# Patient Record
Sex: MALE | Race: WHITE | Employment: OTHER | URBAN - METROPOLITAN AREA
[De-identification: names, ages, dates, MRNs, and addresses within clinical notes are randomized per-mention and may not be internally consistent; named-entity substitution may affect disease eponyms.]

---

## 2017-01-21 ENCOUNTER — LAB REQUISITION (OUTPATIENT)
Dept: LAB | Facility: HOSPITAL | Age: 82
End: 2017-01-21

## 2017-01-21 DIAGNOSIS — I10 ESSENTIAL (PRIMARY) HYPERTENSION: ICD-10-CM

## 2017-01-21 LAB
ALBUMIN SERPL BCP-MCNC: 2.6 G/DL (ref 3.5–4.8)
ALBUMIN/GLOB SERPL: 0.9 {RATIO} (ref 1–2)
ALP SERPL-CCNC: 47 U/L (ref 32–100)
ALT SERPL-CCNC: 12 U/L (ref 17–63)
ANION GAP SERPL CALC-SCNC: 10 MMOL/L (ref 0–18)
AST SERPL-CCNC: 19 U/L (ref 15–41)
BASOPHILS # BLD: 0 K/UL (ref 0–0.2)
BASOPHILS NFR BLD: 1 %
BILIRUB SERPL-MCNC: 0.4 MG/DL (ref 0.3–1.2)
BUN SERPL-MCNC: 13 MG/DL (ref 8–20)
BUN/CREAT SERPL: 16 (ref 10–20)
CALCIUM SERPL-MCNC: 8.2 MG/DL (ref 8.5–10.5)
CHLORIDE SERPL-SCNC: 104 MMOL/L (ref 95–110)
CO2 SERPL-SCNC: 26 MMOL/L (ref 22–32)
CREAT SERPL-MCNC: 0.81 MG/DL (ref 0.5–1.5)
EOSINOPHIL # BLD: 0 K/UL (ref 0–0.7)
EOSINOPHIL NFR BLD: 0 %
ERYTHROCYTE [DISTWIDTH] IN BLOOD BY AUTOMATED COUNT: 17.2 % (ref 11–15)
GLOBULIN PLAS-MCNC: 3 G/DL (ref 2.5–3.7)
GLUCOSE SERPL-MCNC: 100 MG/DL (ref 70–99)
HCT VFR BLD AUTO: 34.8 % (ref 41–52)
HGB BLD-MCNC: 11.4 G/DL (ref 13.5–17.5)
LYMPHOCYTES # BLD: 1.9 K/UL (ref 1–4)
LYMPHOCYTES NFR BLD: 26 %
MCH RBC QN AUTO: 31.3 PG (ref 27–32)
MCHC RBC AUTO-ENTMCNC: 32.8 G/DL (ref 32–37)
MCV RBC AUTO: 95.2 FL (ref 80–100)
MONOCYTES # BLD: 0.8 K/UL (ref 0–1)
MONOCYTES NFR BLD: 11 %
NEUTROPHILS # BLD AUTO: 4.6 K/UL (ref 1.8–7.7)
NEUTROPHILS NFR BLD: 62 %
OSMOLALITY UR CALC.SUM OF ELEC: 290 MOSM/KG (ref 275–295)
PLATELET # BLD AUTO: 185 K/UL (ref 140–400)
PMV BLD AUTO: 8.1 FL (ref 7.4–10.3)
POTASSIUM SERPL-SCNC: 3.7 MMOL/L (ref 3.3–5.1)
PROT SERPL-MCNC: 5.6 G/DL (ref 5.9–8.4)
RBC # BLD AUTO: 3.66 M/UL (ref 4.5–5.9)
SODIUM SERPL-SCNC: 140 MMOL/L (ref 136–144)
WBC # BLD AUTO: 7.3 K/UL (ref 4–11)

## 2017-01-21 PROCEDURE — 85025 COMPLETE CBC W/AUTO DIFF WBC: CPT | Performed by: INTERNAL MEDICINE

## 2017-01-21 PROCEDURE — 80053 COMPREHEN METABOLIC PANEL: CPT | Performed by: INTERNAL MEDICINE

## 2017-02-03 ENCOUNTER — LAB REQUISITION (OUTPATIENT)
Dept: LAB | Facility: HOSPITAL | Age: 82
End: 2017-02-03

## 2017-02-03 DIAGNOSIS — M62.81 MUSCLE WEAKNESS (GENERALIZED): ICD-10-CM

## 2017-02-03 LAB
BASOPHILS # BLD: 0 K/UL (ref 0–0.2)
BASOPHILS NFR BLD: 0 %
EOSINOPHIL # BLD: 0.1 K/UL (ref 0–0.7)
EOSINOPHIL NFR BLD: 1 %
ERYTHROCYTE [DISTWIDTH] IN BLOOD BY AUTOMATED COUNT: 16.3 % (ref 11–15)
HCT VFR BLD AUTO: 36.5 % (ref 41–52)
HGB BLD-MCNC: 11.9 G/DL (ref 13.5–17.5)
LYMPHOCYTES # BLD: 0.9 K/UL (ref 1–4)
LYMPHOCYTES NFR BLD: 14 %
MCH RBC QN AUTO: 31.1 PG (ref 27–32)
MCHC RBC AUTO-ENTMCNC: 32.7 G/DL (ref 32–37)
MCV RBC AUTO: 95.1 FL (ref 80–100)
MONOCYTES # BLD: 0.4 K/UL (ref 0–1)
MONOCYTES NFR BLD: 6 %
NEUTROPHILS # BLD AUTO: 5.3 K/UL (ref 1.8–7.7)
NEUTROPHILS NFR BLD: 79 %
PLATELET # BLD AUTO: 263 K/UL (ref 140–400)
PMV BLD AUTO: 8 FL (ref 7.4–10.3)
RBC # BLD AUTO: 3.84 M/UL (ref 4.5–5.9)
WBC # BLD AUTO: 6.7 K/UL (ref 4–11)

## 2017-02-03 PROCEDURE — 85025 COMPLETE CBC W/AUTO DIFF WBC: CPT | Performed by: INTERNAL MEDICINE

## 2017-04-04 ENCOUNTER — APPOINTMENT (OUTPATIENT)
Dept: GENERAL RADIOLOGY | Facility: HOSPITAL | Age: 82
End: 2017-04-04
Attending: EMERGENCY MEDICINE
Payer: MEDICARE

## 2017-04-04 ENCOUNTER — HOSPITAL ENCOUNTER (EMERGENCY)
Facility: HOSPITAL | Age: 82
Discharge: HOME | End: 2017-04-04
Attending: EMERGENCY MEDICINE
Payer: MEDICARE

## 2017-04-04 ENCOUNTER — APPOINTMENT (OUTPATIENT)
Dept: CT IMAGING | Facility: HOSPITAL | Age: 82
End: 2017-04-04
Attending: EMERGENCY MEDICINE
Payer: MEDICARE

## 2017-04-04 VITALS
TEMPERATURE: 98 F | HEART RATE: 69 BPM | WEIGHT: 246 LBS | SYSTOLIC BLOOD PRESSURE: 131 MMHG | DIASTOLIC BLOOD PRESSURE: 70 MMHG | HEIGHT: 73 IN | BODY MASS INDEX: 32.6 KG/M2 | OXYGEN SATURATION: 100 % | RESPIRATION RATE: 18 BRPM

## 2017-04-04 DIAGNOSIS — S00.03XA SCALP HEMATOMA, INITIAL ENCOUNTER: ICD-10-CM
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
## 2017-04-04 DIAGNOSIS — S80.00XA CONTUSION OF KNEE, UNSPECIFIED LATERALITY, INITIAL ENCOUNTER: ICD-10-CM

## 2017-04-04 DIAGNOSIS — S09.90XA HEAD INJURY, INITIAL ENCOUNTER: ICD-10-CM

## 2017-04-04 PROCEDURE — 70450 CT HEAD/BRAIN W/O DYE: CPT

## 2017-04-04 PROCEDURE — 73560 X-RAY EXAM OF KNEE 1 OR 2: CPT

## 2017-04-04 PROCEDURE — 99284 EMERGENCY DEPT VISIT MOD MDM: CPT

## 2017-04-04 RX ORDER — ACETAMINOPHEN 325 MG/1
650 TABLET ORAL ONCE
Status: COMPLETED | OUTPATIENT
Start: 2017-04-04 | End: 2017-04-04

## 2017-04-04 NOTE — ED INITIAL ASSESSMENT (HPI)
Patient via medics from [facility] home---patient lost balance and fell out of wheelchair hitting head. No LOC, witnessed fall.     Denies any injury or pain    On coumadin

## 2017-04-04 NOTE — ED NOTES
Patient answering questions appropriately, only c/o left knee pain and bump to left of head. On coumadin, CT ordered. Family member at bedside. No loc, no pain at this time.

## 2017-04-05 NOTE — ED PROVIDER NOTES
Patient Seen in: [facility] Emergency Department    History   Patient presents with:  Fall (musculoskeletal, neurologic)      HPI    The patient presents after falling out of his wheelchair at [address] [place] [address].   Hit his left forehead and lef [facility]    PATIENT [place] PREOPERATIVE ORDER FOR IV ANTIBIOTIC SURGICAL SITE INFECTION PROPHYLAXIS.  4/24/2014    Comment Procedure: CERVICAL EPIDURAL INJECTION;  Surgeon: Jono Valenzuela MD;  Location: [address] [place] [address]    PATIENT DO Triage Vitals   BP 04/04/17 1550 104/61 mmHg   Pulse 04/04/17 1550 85   Resp 04/04/17 1550 20   Temp 04/04/17 1550 98.1 °F (36.7 °C)   Temp src 04/04/17 1550 Oral   SpO2 04/04/17 1550 100 %   O2 Device 04/04/17 1550 None (Room air)       Physical Exam   Co Joshua. Additional verbal instructions were given and discussed with the patient and/or caregiver.         Disposition and Plan     Clinical Impression:  Fall from wheelchair, initial encounter  (primary encounter diagnosis)  Head injury, initial enco

## 2017-07-18 ENCOUNTER — HOSPITAL ENCOUNTER (OUTPATIENT)
Dept: CT IMAGING | Facility: HOSPITAL | Age: 82
Discharge: HOME | End: 2017-07-18
Attending: INTERNAL MEDICINE
Payer: MEDICARE

## 2017-07-18 DIAGNOSIS — R51 HEADACHE(784.0): ICD-10-CM

## 2017-07-18 PROCEDURE — 70486 CT MAXILLOFACIAL W/O DYE: CPT | Performed by: INTERNAL MEDICINE

## 2017-07-18 PROCEDURE — 70450 CT HEAD/BRAIN W/O DYE: CPT | Performed by: INTERNAL MEDICINE

## 2018-06-06 ENCOUNTER — HOSPITAL ENCOUNTER (EMERGENCY)
Facility: HOSPITAL | Age: 83
Discharge: HOME | DRG: 871 | End: 2018-06-07
Attending: EMERGENCY MEDICINE
Payer: MEDICARE

## 2018-06-06 ENCOUNTER — APPOINTMENT (OUTPATIENT)
Dept: GENERAL RADIOLOGY | Facility: HOSPITAL | Age: 83
DRG: 871 | End: 2018-06-06
Attending: EMERGENCY MEDICINE
Payer: MEDICARE

## 2018-06-06 DIAGNOSIS — N30.00 ACUTE CYSTITIS WITHOUT HEMATURIA: Primary | ICD-10-CM

## 2018-06-06 PROCEDURE — 96365 THER/PROPH/DIAG IV INF INIT: CPT

## 2018-06-06 PROCEDURE — 87186 SC STD MICRODIL/AGAR DIL: CPT | Performed by: EMERGENCY MEDICINE

## 2018-06-06 PROCEDURE — 83735 ASSAY OF MAGNESIUM: CPT | Performed by: EMERGENCY MEDICINE

## 2018-06-06 PROCEDURE — 80076 HEPATIC FUNCTION PANEL: CPT | Performed by: EMERGENCY MEDICINE

## 2018-06-06 PROCEDURE — 85730 THROMBOPLASTIN TIME PARTIAL: CPT | Performed by: EMERGENCY MEDICINE

## 2018-06-06 PROCEDURE — 71045 X-RAY EXAM CHEST 1 VIEW: CPT | Performed by: EMERGENCY MEDICINE

## 2018-06-06 PROCEDURE — 85610 PROTHROMBIN TIME: CPT | Performed by: EMERGENCY MEDICINE

## 2018-06-06 PROCEDURE — 87077 CULTURE AEROBIC IDENTIFY: CPT | Performed by: EMERGENCY MEDICINE

## 2018-06-06 PROCEDURE — 96361 HYDRATE IV INFUSION ADD-ON: CPT

## 2018-06-06 PROCEDURE — 87086 URINE CULTURE/COLONY COUNT: CPT | Performed by: EMERGENCY MEDICINE

## 2018-06-06 PROCEDURE — 83605 ASSAY OF LACTIC ACID: CPT | Performed by: EMERGENCY MEDICINE

## 2018-06-06 PROCEDURE — 85025 COMPLETE CBC W/AUTO DIFF WBC: CPT | Performed by: EMERGENCY MEDICINE

## 2018-06-06 PROCEDURE — 99285 EMERGENCY DEPT VISIT HI MDM: CPT

## 2018-06-06 PROCEDURE — 81001 URINALYSIS AUTO W/SCOPE: CPT | Performed by: EMERGENCY MEDICINE

## 2018-06-06 PROCEDURE — 87040 BLOOD CULTURE FOR BACTERIA: CPT | Performed by: EMERGENCY MEDICINE

## 2018-06-06 PROCEDURE — 80048 BASIC METABOLIC PNL TOTAL CA: CPT | Performed by: EMERGENCY MEDICINE

## 2018-06-06 RX ORDER — ACETAMINOPHEN 500 MG
1000 TABLET ORAL ONCE
Status: COMPLETED | OUTPATIENT
Start: 2018-06-06 | End: 2018-06-06

## 2018-06-06 RX ORDER — CEPHALEXIN 500 MG/1
500 CAPSULE ORAL 2 TIMES DAILY
Qty: 14 CAPSULE | Refills: 0 | Status: ON HOLD | OUTPATIENT
Start: 2018-06-06 | End: 2018-06-12

## 2018-06-06 RX ORDER — LIDOCAINE HYDROCHLORIDE 20 MG/ML
10 JELLY TOPICAL ONCE
Status: COMPLETED | OUTPATIENT
Start: 2018-06-06 | End: 2018-06-06

## 2018-06-07 ENCOUNTER — APPOINTMENT (OUTPATIENT)
Dept: CT IMAGING | Facility: HOSPITAL | Age: 83
DRG: 871 | End: 2018-06-07
Attending: EMERGENCY MEDICINE
Payer: MEDICARE

## 2018-06-07 ENCOUNTER — HOSPITAL ENCOUNTER (INPATIENT)
Facility: HOSPITAL | Age: 83
LOS: 5 days | Discharge: NURSING, REHAB OR LONG-TERM CARE | DRG: 871 | End: 2018-06-12
Attending: EMERGENCY MEDICINE | Admitting: HOSPITALIST
Payer: MEDICARE

## 2018-06-07 VITALS
RESPIRATION RATE: 20 BRPM | BODY MASS INDEX: 35.78 KG/M2 | SYSTOLIC BLOOD PRESSURE: 108 MMHG | WEIGHT: 270 LBS | TEMPERATURE: 99 F | HEIGHT: 73 IN | HEART RATE: 69 BPM | OXYGEN SATURATION: 92 % | DIASTOLIC BLOOD PRESSURE: 55 MMHG

## 2018-06-07 DIAGNOSIS — N39.0 URINARY TRACT INFECTION WITHOUT HEMATURIA, SITE UNSPECIFIED: Primary | ICD-10-CM

## 2018-06-07 DIAGNOSIS — R78.81 BACTEREMIA: ICD-10-CM

## 2018-06-07 PROCEDURE — 87040 BLOOD CULTURE FOR BACTERIA: CPT | Performed by: EMERGENCY MEDICINE

## 2018-06-07 PROCEDURE — 80048 BASIC METABOLIC PNL TOTAL CA: CPT | Performed by: EMERGENCY MEDICINE

## 2018-06-07 PROCEDURE — 74176 CT ABD & PELVIS W/O CONTRAST: CPT | Performed by: EMERGENCY MEDICINE

## 2018-06-07 PROCEDURE — 83605 ASSAY OF LACTIC ACID: CPT | Performed by: EMERGENCY MEDICINE

## 2018-06-07 PROCEDURE — 96365 THER/PROPH/DIAG IV INF INIT: CPT

## 2018-06-07 PROCEDURE — 85025 COMPLETE CBC W/AUTO DIFF WBC: CPT | Performed by: EMERGENCY MEDICINE

## 2018-06-07 PROCEDURE — 36415 COLL VENOUS BLD VENIPUNCTURE: CPT

## 2018-06-07 PROCEDURE — 99285 EMERGENCY DEPT VISIT HI MDM: CPT

## 2018-06-07 PROCEDURE — 96366 THER/PROPH/DIAG IV INF ADDON: CPT

## 2018-06-07 RX ORDER — SODIUM CHLORIDE 0.9 % (FLUSH) 0.9 %
3 SYRINGE (ML) INJECTION AS NEEDED
Status: DISCONTINUED | OUTPATIENT
Start: 2018-06-07 | End: 2018-06-12

## 2018-06-07 RX ORDER — METOCLOPRAMIDE HYDROCHLORIDE 5 MG/ML
10 INJECTION INTRAMUSCULAR; INTRAVENOUS EVERY 8 HOURS PRN
Status: DISCONTINUED | OUTPATIENT
Start: 2018-06-07 | End: 2018-06-12

## 2018-06-07 RX ORDER — ONDANSETRON 2 MG/ML
4 INJECTION INTRAMUSCULAR; INTRAVENOUS EVERY 6 HOURS PRN
Status: DISCONTINUED | OUTPATIENT
Start: 2018-06-07 | End: 2018-06-12

## 2018-06-07 RX ORDER — POLYETHYLENE GLYCOL 3350 17 G/17G
17 POWDER, FOR SOLUTION ORAL DAILY PRN
Status: DISCONTINUED | OUTPATIENT
Start: 2018-06-07 | End: 2018-06-12

## 2018-06-07 RX ORDER — ACETAMINOPHEN 325 MG/1
650 TABLET ORAL EVERY 6 HOURS PRN
Status: DISCONTINUED | OUTPATIENT
Start: 2018-06-07 | End: 2018-06-08

## 2018-06-07 RX ORDER — DEXTROSE MONOHYDRATE 25 G/50ML
50 INJECTION, SOLUTION INTRAVENOUS AS NEEDED
Status: DISCONTINUED | OUTPATIENT
Start: 2018-06-07 | End: 2018-06-12

## 2018-06-07 RX ORDER — BISACODYL 10 MG
10 SUPPOSITORY, RECTAL RECTAL
Status: DISCONTINUED | OUTPATIENT
Start: 2018-06-07 | End: 2018-06-12

## 2018-06-07 NOTE — ED PROVIDER NOTES
Patient Seen in: [facility] Emergency Department    History   Patient presents with:  Abnormal Result (metabolic, cardiac)    Stated Complaint: + GRAM NEGATIVE BLOOD CULTURE RETURN    HPI    Patient is an 66-year-old gentleman who is in the ER yes MD;  Location: [address] [place] [address],[address]  4/24/2014: PATIENT DOCUMENTED NOT TO HAVE EXPERIENCED ANY*      Comment: Procedure: CERVICAL EPIDURAL INJECTION;                 Surgeon: Joan Major MD;  Location: [facility]                SURGICAL RL rate, regular rhythm, normal heart sounds and intact distal pulses. Pulmonary/Chest: Effort normal and breath sounds normal. No respiratory distress. Abdominal: Soft. Bowel sounds are normal. Exhibits no distension and no mass. There is no tenderness. 2015  ------------------------------------------------------------      [facility]     Admission disposition: 6/7/2018  8:15 PM       I spoke with the [facility] hospitalist who request I speak with urology and infectious disease.   Infectious disease request that blood unspecified  (primary encounter diagnosis)  Bacteremia    Disposition:  Admit  6/7/2018  8:15 pm    Follow-up:  No follow-up provider specified.   We recommend that you schedule follow up care with a primary care provider within the next three months to obt

## 2018-06-07 NOTE — PROGRESS NOTES
[facility] Pharmacy Note: Antimicrobial Weight Dose Adjustment for: ceftriaxone (ROCEPHIN)    Oriana Vazquez is a 80year old male who has been prescribed ceftriaxone (ROCEPHIN) 1000 mg every 24 hours.   CrCl is estimated creatinine clearance is 46.6 mL/min (bas

## 2018-06-07 NOTE — ED INITIAL ASSESSMENT (HPI)
Care assumed from ems. Per ems, pt has had a fever for a few days and and started vomiting today. Pt denies feelign nauseous and denies pain at this time. Pt denies diarrhea. Pt is aox3-4. Skin color is appropriate.

## 2018-06-07 NOTE — ED NOTES
ermd recommends to leave the iv in  For possible iv antbx at nursing home. rn at [facility] confirmed that this rn can leave the iv in for transport.

## 2018-06-07 NOTE — ED PROVIDER NOTES
Patient Seen in: [facility] Emergency Department    History   Patient presents with:  Fever (infectious)    Stated Complaint: Fever    HPI    25-year-old male presents from the nursing home for complaint of a fever for the past 2 days.   Patient re Nima  3/20/2014: PATIENT DOCUMENTED NOT TO HAVE EXPERIENCED ANY*      Comment: Procedure: CERVICAL EPIDURAL;  Surgeon:                Tomeka Patel MD;  Location: [address] [place] [address],[address]  4/24/2014: PATIENT DOCUMENTED NOT TO HAVE Viviana Irvin Wt 122.5 kg   SpO2 92%   BMI 35.62 kg/m²         Physical Exam   Constitutional: He is oriented to person, place, and time. He appears well-developed and well-nourished. HENT:   Head: Normocephalic and atraumatic.    Mouth/Throat: Oropharynx is clear and Notable for the following:     RBC 3.63 (*)     HGB 10.9 (*)     HCT 32.8 (*)     RDW 16.9 (*)     MPV 7.2 (*)     All other components within normal limits   LACTIC ACID, PLASMA - Normal   MAGNESIUM - Normal   PROTHROMBIN TIME (PT) - Normal   PTT, ACTIVAT that patient can be discharged home. Patient is otherwise without any evidence of severe sepsis or septic shock. Imaging:   Xr Chest Ap Portable  (cpt=71045)    Result Date: 6/6/2018  PROCEDURE: XR CHEST AP PORTABLE (CPT=71010) TIME: 1933 hrs.   Eric Hobson hematuria  (primary encounter diagnosis)    Disposition:  Discharge  6/6/2018 10:51 pm    Follow-up:  Rickey Pettit MD  [address] [facility] [address] [phone]    Schedule an appointment as soon as possible for a visit in 2 days  For fallon

## 2018-06-07 NOTE — ED NOTES
Report given to rn at [facility] of [address] for [facility] = 0030. Family members remain at [facility].

## 2018-06-08 PROCEDURE — 83036 HEMOGLOBIN GLYCOSYLATED A1C: CPT | Performed by: HOSPITALIST

## 2018-06-08 PROCEDURE — 82962 GLUCOSE BLOOD TEST: CPT

## 2018-06-08 PROCEDURE — 85025 COMPLETE CBC W/AUTO DIFF WBC: CPT | Performed by: HOSPITALIST

## 2018-06-08 PROCEDURE — 83735 ASSAY OF MAGNESIUM: CPT | Performed by: HOSPITALIST

## 2018-06-08 PROCEDURE — 94660 CPAP INITIATION&MGMT: CPT

## 2018-06-08 PROCEDURE — 80048 BASIC METABOLIC PNL TOTAL CA: CPT | Performed by: HOSPITALIST

## 2018-06-08 PROCEDURE — 5A09357 ASSISTANCE WITH RESPIRATORY VENTILATION, LESS THAN 24 CONSECUTIVE HOURS, CONTINUOUS POSITIVE AIRWAY PRESSURE: ICD-10-PCS | Performed by: HOSPITALIST

## 2018-06-08 PROCEDURE — A4216 STERILE WATER/SALINE, 10 ML: HCPCS | Performed by: HOSPITALIST

## 2018-06-08 PROCEDURE — 85610 PROTHROMBIN TIME: CPT | Performed by: HOSPITALIST

## 2018-06-08 RX ORDER — FAMOTIDINE 20 MG/1
20 TABLET ORAL DAILY
Status: ON HOLD | COMMUNITY
End: 2018-06-12

## 2018-06-08 RX ORDER — GABAPENTIN 300 MG/1
600 CAPSULE ORAL NIGHTLY
Status: DISCONTINUED | OUTPATIENT
Start: 2018-06-08 | End: 2018-06-12

## 2018-06-08 RX ORDER — ALLOPURINOL 300 MG/1
300 TABLET ORAL DAILY
Status: DISCONTINUED | OUTPATIENT
Start: 2018-06-08 | End: 2018-06-12

## 2018-06-08 RX ORDER — ALFUZOSIN HYDROCHLORIDE 10 MG/1
10 TABLET, EXTENDED RELEASE ORAL
Status: DISCONTINUED | OUTPATIENT
Start: 2018-06-08 | End: 2018-06-12

## 2018-06-08 RX ORDER — FAMOTIDINE 20 MG/1
20 TABLET ORAL DAILY
Status: DISCONTINUED | OUTPATIENT
Start: 2018-06-08 | End: 2018-06-08

## 2018-06-08 RX ORDER — CYCLOBENZAPRINE HCL 10 MG
10 TABLET ORAL NIGHTLY
Status: DISCONTINUED | OUTPATIENT
Start: 2018-06-08 | End: 2018-06-12

## 2018-06-08 RX ORDER — FLUTICASONE PROPIONATE 50 MCG
1 SPRAY, SUSPENSION (ML) NASAL DAILY
Status: DISCONTINUED | OUTPATIENT
Start: 2018-06-08 | End: 2018-06-12

## 2018-06-08 RX ORDER — FINASTERIDE 5 MG/1
5 TABLET, FILM COATED ORAL DAILY
Status: DISCONTINUED | OUTPATIENT
Start: 2018-06-08 | End: 2018-06-12

## 2018-06-08 RX ORDER — DONEPEZIL HYDROCHLORIDE 10 MG/1
10 TABLET, FILM COATED ORAL DAILY
Status: DISCONTINUED | OUTPATIENT
Start: 2018-06-08 | End: 2018-06-12

## 2018-06-08 RX ORDER — PANTOPRAZOLE SODIUM 40 MG/1
40 TABLET, DELAYED RELEASE ORAL
Status: DISCONTINUED | OUTPATIENT
Start: 2018-06-08 | End: 2018-06-12

## 2018-06-08 RX ORDER — ACETAMINOPHEN 10 MG/ML
1000 INJECTION, SOLUTION INTRAVENOUS ONCE
Status: COMPLETED | OUTPATIENT
Start: 2018-06-08 | End: 2018-06-08

## 2018-06-08 RX ORDER — DULOXETIN HYDROCHLORIDE 30 MG/1
60 CAPSULE, DELAYED RELEASE ORAL DAILY
Status: DISCONTINUED | OUTPATIENT
Start: 2018-06-08 | End: 2018-06-12

## 2018-06-08 NOTE — CM/SW NOTE
EDWIN confirmed w/ Jasmin Neighbors from [facility] that pt is from their facility long term. EDWIN asked CIPRIANO/Belinda to send clinical updates to [facility] via [employer]. EDWIN/JAZMINE to remain available for support and/or discharge planning.      Mal Chacon, [address]

## 2018-06-08 NOTE — ED NOTES
Patient to ED with positive blood culture result. Rochephin given as ordered; blood cultures previously drawn. New order for blood cultures received however Rochephin already began infusing. Dr. Dimple Arreola notified, ok to draw new set of blood cultures.

## 2018-06-08 NOTE — CONSULTS
[facility] - [facility]    Report of Consultation    Jayme Norwood Patient Status:  Inpatient    10/8/1935 MRN [id number]   Location [facility] 5SW/SE Attending Branden Garcia MD   Hosp Day # 1 PCP Alla Cardona MD     Date of Admission Nima  3/20/2014: PATIENT DOCUMENTED NOT TO HAVE EXPERIENCED ANY*      Comment: Procedure: CERVICAL EPIDURAL;  Surgeon:                Joan Major MD;  Location: [address] [place], [place] [address]  4/24/2014: PATIENT DOCUMENTED NOT TO HAVE [address] mg, 10 mg, Oral, Daily with breakfast  •  Meropenem (MERREM) 500 mg in sodium chloride 0.9% 100 mL MBP, 500 mg, Intravenous, Q8H  •  Normal Saline Flush 0.9 % injection 3 mL, 3 mL, Intravenous, PRN  •  PEG 3350 (MIRALAX) powder packet 17 g, 17 g, Oral, Kelly Melchor primary contributing factor to UTI  Needs bladder decompression via wallis, pt agreeable  Did require dilation of the meatal stenosis with Liban Platts buren sounds  16fr wallis then placed and secured to gravity drainage, urine cloudy  Pt tolerated well  Otherwise s

## 2018-06-08 NOTE — CONSULTS
[facility] AND CLINICS  [facility] Infectious Disease Consult    Early Danas Patient Status:  Inpatient    10/8/1935 MRN [id number]   Location [facility] 5SW/SE Attending Scot Jeffrey DO   Hosp Day # 1 PCP Yuri Agarwal MD     Reason for Consultation: Surgeon: Samantha He MD;  Location: [address] [place] [address] date: OTHER SURGICAL HISTORY      Comment: bleeding ulcer treated  4/19/16: OTHER SURGICAL HISTORY      Comment: Cysto  Dr. Dorene Hood  3/20/2014: PATIENT DOCUMENTED NOT T % 100 mL MBP/ADD-vantage, 2 g, Intravenous, Q24H  •  dextrose 50 % injection 50 mL, 50 mL, Intravenous, PRN  •  Glucose-Vitamin C (DEX-4) 4-0.006 g chewable tab 4 tablet, 4 tablet, Oral, Q15 Min PRN  •  glucose (DEX4) oral liquid 15 g, 15 g, Oral, Q15 Min 110/69 - - 74 21 97 % - -   06/07/18 1830 132/69 - - 67 12 97 % - -   06/07/18 1800 131/75 99.7 °F (37.6 °C) Oral 66 17 95 % 6' 1\" (1.854 m) 277 lb (125.6 kg)         Physical Exam:   General: alert, cooperative, oriented. No respiratory distress.    Head calculus. There is mild perivesical fat infiltration   consistent with infection and/or inflammation. 3. Large amount of stool throughout the colon consistent with constipation. Fecal ball in the rectum suggests impending fecal impaction.  Correlate clinic Constipation, will get PSA too,   Will check post void residual too,   Tenderness in RUQ still, IV hydration per PCP,     3. Cough on admission: ? Aspiration with emesis, constipation,   No cough at this point,   On CPAP with baseline settings,     4.

## 2018-06-08 NOTE — PLAN OF CARE
Problem: Diabetes/Glucose Control  Goal: Glucose maintained within prescribed range  INTERVENTIONS:  - Monitor Blood Glucose as ordered  - Assess for signs and symptoms of hyperglycemia and hypoglycemia  - Administer ordered medications to maintain glucose of injury  - Provide assistive devices as appropriate  - Consider OT/PT consult to assist with strengthening/mobility  - Encourage toileting schedule   Outcome: Progressing  No falls this shift.  Bed in low locked position, call light in reach, fall precaut

## 2018-06-08 NOTE — H&P
[facility] HOSPITALIST HISTORY AND PHYSICAL     CC: Patient presents with:  Abnormal Result (metabolic, cardiac)       PCP: Norma Lopez MD    History of Present Illness:   Patient is a 80year old male with PMH sig for Parkinsons, IDDM, hypothyroidism, HTN, HL CERVICAL EPIDURAL INJECTION;                 Surgeon: Josué Myers MD;  Location: [address] [place] [address]  No date: OTHER SURGICAL HISTORY      Comment: bleeding ulcer treated  4/19/16: OTHER SURGICAL HISTORY      Comment: Cysto  Dr. Richard Ivan LE edema b/l , DP pulses 2+ b/l  Neuro: sensation intact, no focal deficits  SKIN- no rashes, no lesion  PSYCH- aao to self not location or year       LABS:     Lab Results  Component Value Date   WBC 8.2 06/08/2018   HGB 9.9 06/08/2018   HCT 29.2 06/08/20 well-seen. ADRENALS:   No defined mass or abnormal enlargement. URINARY BLADDER: Partially distended bladder with mild diffuse wall thickening and/or trabeculation. Mild perivesical infiltration. No gross bladder mass/lesion or intravesical calculus.   LI LUNG BASES: Mild posterior pleural thickening versus trace left effusion. COPD with bibasilar atelectasis and or scarring. OTHER: Mitral annular and coronary artery calcifications      CONCLUSION:  1. The study is limited by artifact.  Suggestion of mild ri FINDINGS:  CARDIAC/VASC: Heart size and pulmonary vascularity normal. Atherosclerotic calcification aorta. MEDIAST/HALIMA:   No visible mass or adenopathy. LUNGS/PLEURA: Suboptimal inspiration with vascular crowding. No consolidation or pleural effusion.  BON

## 2018-06-08 NOTE — PHYSICAL THERAPY NOTE
Attempted physical therapy treatment. Patient presented in bed sleeping; CPAP on. Patient agreeable to work with therapy, however, kept falling asleep unable to participate. Will re-attempt once patient is appropriate to participate.  Patient left in care o

## 2018-06-08 NOTE — OCCUPATIONAL THERAPY NOTE
Orders received, chart reviewed. RN aware of intent to treat. Upon arrival, pt supine in bed sleeping w/ CPAP on. Pt aroused to calling of his name and was agreeable to therapy.  However, pt promptly falling asleep while attempt made to obtain occupational

## 2018-06-08 NOTE — PLAN OF CARE
Problem: Diabetes/Glucose Control  Goal: Glucose maintained within prescribed range  INTERVENTIONS:  - Monitor Blood Glucose as ordered  - Assess for signs and symptoms of hyperglycemia and hypoglycemia  - Administer ordered medications to maintain glucose SAFETY ADULT - FALL  Goal: Free from fall injury  INTERVENTIONS:  - Assess pt frequently for physical needs  - Identify cognitive and physical deficits and behaviors that affect risk of falls. - [place] fall precautions as indicated by assessment.   - Ed

## 2018-06-09 PROCEDURE — 82962 GLUCOSE BLOOD TEST: CPT

## 2018-06-09 PROCEDURE — 85610 PROTHROMBIN TIME: CPT | Performed by: HOSPITALIST

## 2018-06-09 PROCEDURE — 97161 PT EVAL LOW COMPLEX 20 MIN: CPT

## 2018-06-09 PROCEDURE — 94660 CPAP INITIATION&MGMT: CPT

## 2018-06-09 PROCEDURE — 80048 BASIC METABOLIC PNL TOTAL CA: CPT | Performed by: HOSPITALIST

## 2018-06-09 PROCEDURE — 97110 THERAPEUTIC EXERCISES: CPT

## 2018-06-09 PROCEDURE — A4216 STERILE WATER/SALINE, 10 ML: HCPCS | Performed by: HOSPITALIST

## 2018-06-09 PROCEDURE — 97167 OT EVAL HIGH COMPLEX 60 MIN: CPT

## 2018-06-09 PROCEDURE — 85025 COMPLETE CBC W/AUTO DIFF WBC: CPT | Performed by: HOSPITALIST

## 2018-06-09 PROCEDURE — 92610 EVALUATE SWALLOWING FUNCTION: CPT

## 2018-06-09 RX ORDER — ENOXAPARIN SODIUM 100 MG/ML
40 INJECTION SUBCUTANEOUS DAILY
Status: DISCONTINUED | OUTPATIENT
Start: 2018-06-09 | End: 2018-06-12

## 2018-06-09 NOTE — PROGRESS NOTES
[facility] AND [facility] Infectious Disease Progress Note    Tonja Koo Patient Status:  Inpatient    10/8/1935 MRN [id number]   Location [facility] 5SW/SE Attending Tyler Robles MD   Hosp Day # 2 PCP Byron Silva MD     Subjective:  Alexandria Tristan Glucose-Vitamin C (DEX-4) 4-0.006 g chewable tab 4 tablet, 4 tablet, Oral, Q15 Min PRN  •  glucose (DEX4) oral liquid 15 g, 15 g, Oral, Q15 Min PRN  •  Insulin Aspart Pen (NOVOLOG) 100 UNIT/ML flexpen 1-5 Units, 1-5 Units, Subcutaneous, TID CC    Physical now and started on IV Meropenem, d # 2,      2.   R Pyelonephritis: with abn UA,   CT scan with R hydro, possible stone R Ureter, Cystitis,   ? Chronic outlet obstruction, ?   Constipation,   Wolf in now, Noted Urology input noted,   Tenderness in RUQ stil

## 2018-06-09 NOTE — PROGRESS NOTES
[facility] Patient Status:  Inpatient    10/8/1935 MRN [id number]   Location [facility] 5SW/SE Attending Messi Gillette MD   Hosp Day # 2 PCP [place] Flores MD     Subjective:   Interval History: has no complaint, much urinary retention, meatal stricture, urosepsis     Discussed with pt  Clinically much improved from yesterday  Cont abx  Cont wallis  Likely outpt future voiding trial once infection fully cleared  Cont bph meds in interim  All questions answered  Teagan Dimas

## 2018-06-09 NOTE — OCCUPATIONAL THERAPY NOTE
OCCUPATIONAL THERAPY EVALUATION - INPATIENT     Room Number: 526/526-A  Evaluation Date: 6/9/2018  Type of Evaluation: Initial  Presenting Problem: UTI    Physician Order: IP Consult to Occupational Therapy  Reason for Therapy: ADL/IADL Dysfunction and D History:  3/20/2014: [facility] SEVILLA & Michelle Jacob NDL/CATH SPI DX/THER EAM      Comment: Procedure: CERVICAL EPIDURAL;  Surgeon:                Yunior Blanca MD;  Location: / [address] [place] [address]  4/24/2014: [facility] NDL/CATH SPI DX/THER Equipment: Hospital bed    Occupation/Status: retired  Hand Dominance: Right  Drives: No  Patient Regularly Uses: Reading glasses    Stairs in Home: 0  Use of Assistive Device(s): bed bound, wheelchair    Prior Level of [place]: dependent, dilan lift (G-Code): CM    FUNCTIONAL TRANSFER ASSESSMENT  Supine to Sit : Dependent assistance  Sit to Stand: Not tested  Toilet Transfer: dependent  Shower Transfer: dependent  Chair Transfer: dependent    Bedroom Mobility: unable    BALANCE ASSESSMENT  Static Sitt

## 2018-06-09 NOTE — SLP NOTE
ADULT SWALLOWING EVALUATION    ASSESSMENT    ASSESSMENT/OVERALL IMPRESSION:  Pt seen sitting upright in bed for BSE. Pt alert, oriented, and cooperative for PO trials.  SLP fed pt trials of solid consistentcy, mechanical soft consistency, puree consistency, Urinary tract infection without hematuria    Bacteremia    Past Medical History  Past Medical History:   Diagnosis Date   • Depression    • Hypothyroidism    • Obesity, unspecified    • Other and unspecified hyperlipidemia    • Unspecified essential hypert without overt signs or symptoms of aspiration with 100 % accuracy over 2 session(s).   In Progress   Goal #2 The patient/family/caregiver will demonstrate understanding and implementation of aspiration precautions and swallow strategies independently over 2

## 2018-06-09 NOTE — PROGRESS NOTES
[facility] Hospitalist Progress Note     PCP: Kj Jain MD    CC: Follow up       Assessment/Plan:     Patient is a 80year old male with PMH sig for Parkinsons, IDDM, hypothyroidism, HTN, HL, GENET a/w abnormal blood culture.  He originally presented to the °C)  Pulse:  [59-75] 75  Resp:  [16-20] 20  BP: (105-144)/(52-74) 105/52    Intake/Output:    Intake/Output Summary (Last 24 hours) at 06/09/18 1753  Last data filed at 06/09/18 1335   Gross per 24 hour   Intake              420 ml   Output             220 --   1.1   --   1.2  1.1       Recent Labs   Lab  06/05/18 1820 06/06/18   1943  06/07/18   1814  06/08/18   0554  06/09/18   0557   NA  135*  137  138  139  139   K  4.4  4.4  4.2  4.1  4.1   CL  104  103  106  107  105   CO2  23  24  24  25  25   BUN significant gastritis. Patient has a history of prior gastritis and gastric ulcer disease. Surgical clip at the duodenum may relate to prior surgery. Correlate clinically.      Dictated by (CST): Franca Medrano MD on 6/07/2018 at 19:47     Approved by (CS

## 2018-06-09 NOTE — PHYSICAL THERAPY NOTE
Chart reviewed, RN provided consent to see pt. Subjective/ History:   Pt greeted, pt reporting he is mostly bed bound at baseline, living at [facility] with assist for all ADLs.  Pt reports he transfers to w/c 1x/week to have dinner with his d verbal cues, agreeable to perform home exercises-staff and daughter to encourage as able. Pt appears to be at his functional baseline- total assist for all ADLs, to d/c from acute PT at this time.  PT recommendation return to SNF at [facility] with 24 hr

## 2018-06-09 NOTE — PLAN OF CARE
Problem: Diabetes/Glucose Control  Goal: Glucose maintained within prescribed range  INTERVENTIONS:  - Monitor Blood Glucose as ordered  - Assess for signs and symptoms of hyperglycemia and hypoglycemia  - Administer ordered medications to maintain glucose assessment.  - Educate pt/family on patient safety including physical limitations  - Instruct pt to call for assistance with activity based on assessment  - Modify environment to reduce risk of injury  - Provide assistive devices as appropriate  - Consider

## 2018-06-09 NOTE — PLAN OF CARE
Problem: Patient/Family Goals  Goal: Patient/Family Long Term Goal  Patient's Long Term Goal: return to [place]    Interventions:  - IV abx  Follow MD orders  Monitor labs  - See additional Care Plan goals for specific interventions    Outcome: Progressi assessment  - Modify environment to reduce risk of injury  - Provide assistive devices as appropriate  - Consider OT/PT consult to assist with strengthening/mobility  - Encourage toileting schedule   Outcome: Progressing  No falls this shift.  Bed in low lo

## 2018-06-10 PROCEDURE — 94660 CPAP INITIATION&MGMT: CPT

## 2018-06-10 PROCEDURE — 80048 BASIC METABOLIC PNL TOTAL CA: CPT | Performed by: HOSPITALIST

## 2018-06-10 PROCEDURE — A4216 STERILE WATER/SALINE, 10 ML: HCPCS

## 2018-06-10 PROCEDURE — 85025 COMPLETE CBC W/AUTO DIFF WBC: CPT | Performed by: HOSPITALIST

## 2018-06-10 PROCEDURE — A4216 STERILE WATER/SALINE, 10 ML: HCPCS | Performed by: HOSPITALIST

## 2018-06-10 PROCEDURE — 82962 GLUCOSE BLOOD TEST: CPT

## 2018-06-10 RX ORDER — POTASSIUM CHLORIDE 20 MEQ/1
40 TABLET, EXTENDED RELEASE ORAL ONCE
Status: COMPLETED | OUTPATIENT
Start: 2018-06-10 | End: 2018-06-10

## 2018-06-10 RX ORDER — 0.9 % SODIUM CHLORIDE 0.9 %
VIAL (ML) INJECTION
Status: COMPLETED
Start: 2018-06-10 | End: 2018-06-10

## 2018-06-10 NOTE — PLAN OF CARE
Problem: Diabetes/Glucose Control  Goal: Glucose maintained within prescribed range  INTERVENTIONS:  - Monitor Blood Glucose as ordered  - Assess for signs and symptoms of hyperglycemia and hypoglycemia  - Administer ordered medications to maintain glucose Progressing      Problem: SAFETY ADULT - FALL  Goal: Free from fall injury  INTERVENTIONS:  - Assess pt frequently for physical needs  - Identify cognitive and physical deficits and behaviors that affect risk of falls.   - [place] fall precautions as josselyn

## 2018-06-10 NOTE — PROGRESS NOTES
[facility] AND [facility] Infectious Disease Progress Note    Rodri Goldsmith Patient Status:  Inpatient    10/8/1935 MRN [id number]   Location [facility] 5SW/SE Attending Timbo Davis MD   Hosp Day # 3 PCP Jagdish Wiley MD     Subjective:  Abraham Caputo % injection 50 mL, 50 mL, Intravenous, PRN  •  Glucose-Vitamin C (DEX-4) 4-0.006 g chewable tab 4 tablet, 4 tablet, Oral, Q15 Min PRN  •  glucose (DEX4) oral liquid 15 g, 15 g, Oral, Q15 Min PRN  •  Insulin Aspart Pen (NOVOLOG) 100 UNIT/ML flexpen 1-5 Unit ,   Off of IV Ceftriaxone now and started on IV Meropenem, d # 3,   Will get UA and cul today again,     2.   R Pyelonephritis: with abn UA,   CT scan with R hydro, possible stone R Ureter, Cystitis,   ? Chronic outlet obstruction, ?   Constipation,

## 2018-06-10 NOTE — PROGRESS NOTES
[facility] Hospitalist Progress Note     PCP: Maria Esther Dubois MD    CC: Follow up       Assessment/Plan:     Patient is a 80year old male with PMH sig for Parkinsons, IDDM, hypothyroidism, HTN, HL, GENET a/w abnormal blood culture.  He originally presented to the °F (37.1 °C)] 97.5 °F (36.4 °C)  Pulse:  [55-75] 55  Resp:  [18-22] 20  BP: ()/(49-59) 116/59    Intake/Output:    Intake/Output Summary (Last 24 hours) at 06/10/18 1440  Last data filed at 06/10/18 9888   Gross per 24 hour   Intake              680 06/10/18   0516   WBC  7.4  8.7  8.2  8.5  8.6   HGB  10.9*  10.0*  9.9*  10.9*  10.2*   MCV  90.2  90.1  90.2  90.5  90.4   PLT  232  227  224  281  299   INR  1.1   --   1.2  1.1   --        Recent Labs   Lab  06/06/18   1943  06/07/18   1814  06/08/18

## 2018-06-10 NOTE — PLAN OF CARE
Problem: Patient/Family Goals  Goal: Patient/Family Long Term Goal  Patient's Long Term Goal: return to [facility]    Interventions:  - IV abx  Follow MD orders  Monitor labs  - See additional Care Plan goals for specific interventions    Outcome: Progressi pt to call for assistance with activity based on assessment  - Modify environment to reduce risk of injury  - Provide assistive devices as appropriate  - Consider OT/PT consult to assist with strengthening/mobility  - Encourage toileting schedule   Outcome

## 2018-06-10 NOTE — PROGRESS NOTES
[address] [facility] Patient Status:  Inpatient    10/8/1935 MRN [id number]   Location [facility] 5SW/SE Attending Anny Hackett MD   Hosp Day # 3 PCP Dmitriy Rubio MD     Subjective:   Interval History:  Resting comfortably retention    Continue wallis  Continue antibiotics per ID  outpt voiding trial in 2-3 wks once infection cleared  will follow peripherally at this time; please call with any further questions/concerns    Nathalie Pickett MD     LOS: 3 days

## 2018-06-11 PROCEDURE — 94660 CPAP INITIATION&MGMT: CPT

## 2018-06-11 PROCEDURE — 87086 URINE CULTURE/COLONY COUNT: CPT | Performed by: INTERNAL MEDICINE

## 2018-06-11 PROCEDURE — 81001 URINALYSIS AUTO W/SCOPE: CPT | Performed by: INTERNAL MEDICINE

## 2018-06-11 PROCEDURE — 85025 COMPLETE CBC W/AUTO DIFF WBC: CPT | Performed by: HOSPITALIST

## 2018-06-11 PROCEDURE — 82962 GLUCOSE BLOOD TEST: CPT

## 2018-06-11 PROCEDURE — 36569 INSJ PICC 5 YR+ W/O IMAGING: CPT

## 2018-06-11 PROCEDURE — A4216 STERILE WATER/SALINE, 10 ML: HCPCS | Performed by: HOSPITALIST

## 2018-06-11 PROCEDURE — B548ZZA ULTRASONOGRAPHY OF SUPERIOR VENA CAVA, GUIDANCE: ICD-10-PCS | Performed by: HOSPITALIST

## 2018-06-11 PROCEDURE — 84132 ASSAY OF SERUM POTASSIUM: CPT | Performed by: HOSPITALIST

## 2018-06-11 PROCEDURE — 02HV33Z INSERTION OF INFUSION DEVICE INTO SUPERIOR VENA CAVA, PERCUTANEOUS APPROACH: ICD-10-PCS | Performed by: HOSPITALIST

## 2018-06-11 PROCEDURE — 80048 BASIC METABOLIC PNL TOTAL CA: CPT | Performed by: HOSPITALIST

## 2018-06-11 PROCEDURE — 76937 US GUIDE VASCULAR ACCESS: CPT

## 2018-06-11 RX ORDER — SODIUM CHLORIDE 0.9 % (FLUSH) 0.9 %
10 SYRINGE (ML) INJECTION AS NEEDED
Status: DISCONTINUED | OUTPATIENT
Start: 2018-06-11 | End: 2018-06-12

## 2018-06-11 RX ORDER — LIDOCAINE HYDROCHLORIDE 10 MG/ML
0.5 INJECTION, SOLUTION INFILTRATION; PERINEURAL ONCE AS NEEDED
Status: ACTIVE | OUTPATIENT
Start: 2018-06-11 | End: 2018-06-11

## 2018-06-11 NOTE — CM/SW NOTE
EDWIN asked CIPRIANO/Belinda to send clinical updates to [facility] via [employer]. Dr. Danni Rojas informed SW that pt will likely discharge Tuesday w/ IV-meropenem. EDWIN informed Arturo Salter from [facility] re: above. EDWIN will arrange ambulance transport.      Lindy Bonds, [facility]

## 2018-06-11 NOTE — PROGRESS NOTES
Vascular Access Note  Inserted by Raj Bustos Rn    Vascular Access Screening:   Allergies to Lidocaine: no  Allergies to Latex: no  Presence of Pacemaker/Defibrillator: No  Mastectomy with Lymph Node Dissection: No  AV Fistula / AV Graft: No  Dialysis Cathete

## 2018-06-11 NOTE — PLAN OF CARE
Diabetes/Glucose Control    • Glucose maintained within prescribed range Progressing        GENITOURINARY - ADULT    • Absence of urinary retention Progressing        Patient Centered Care    • Patient preferences are identified and integrated in the patie

## 2018-06-11 NOTE — PROGRESS NOTES
[facility] AND [facility] Infectious Disease  Progress Note    Elaine Coleman Patient Status:  Inpatient    10/8/1935 MRN [id number]   Location [facility] 5SW/SE Attending Carmen Osborn MD   Hosp Day # 4 PCP Kj Jain MD     Subjective:  Patient calculus at the distal right ureter although this is not certain. Bladder wall thickening, nonspecific may reflect underdistention or cystitis. No gross bladder mass/lesion or intravesical calculus.  There is mild perivesical fat infiltration consistent wit

## 2018-06-11 NOTE — CM/SW NOTE
Met with patient at bedside to explain the BPCI/Medicare program. Patient agreed with phone follow up for 3 months from [facility] after discharge from [address] [place] [address]. Patient was enrolled under . BPCI/Medicare letter and brochure provided.

## 2018-06-11 NOTE — PROGRESS NOTES
[facility] Hospitalist Progress Note     PCP: Yue Preciado MD    CC: Follow up       Assessment/Plan:     Patient is a 80year old male with PMH sig for Parkinsons, IDDM, hypothyroidism, HTN, HL, GENET a/w abnormal blood culture.  He originally presented to the (36.3 °C)  Pulse:  [57-68] 57  Resp:  [18-20] 18  BP: (115-126)/(56-64) 126/56    Intake/Output:    Intake/Output Summary (Last 24 hours) at 06/11/18 1340  Last data filed at 06/11/18 0641   Gross per 24 hour   Intake              960 ml   Output 06/09/18   0557  06/10/18   0516  06/11/18   0518   WBC  7.4  8.7  8.2  8.5  8.6  7.3   HGB  10.9*  10.0*  9.9*  10.9*  10.2*  9.8*   MCV  90.2  90.1  90.2  90.5  90.4  91.2   PLT  232  227  224  281  299  305   INR  1.1   --   1.2  1.1   --    --        R

## 2018-06-12 VITALS
TEMPERATURE: 97 F | BODY MASS INDEX: 34.79 KG/M2 | RESPIRATION RATE: 16 BRPM | HEIGHT: 73 IN | HEART RATE: 54 BPM | DIASTOLIC BLOOD PRESSURE: 66 MMHG | SYSTOLIC BLOOD PRESSURE: 108 MMHG | WEIGHT: 262.5 LBS | OXYGEN SATURATION: 98 %

## 2018-06-12 PROCEDURE — 80048 BASIC METABOLIC PNL TOTAL CA: CPT | Performed by: HOSPITALIST

## 2018-06-12 PROCEDURE — 85025 COMPLETE CBC W/AUTO DIFF WBC: CPT | Performed by: HOSPITALIST

## 2018-06-12 PROCEDURE — A4216 STERILE WATER/SALINE, 10 ML: HCPCS | Performed by: HOSPITALIST

## 2018-06-12 PROCEDURE — 82962 GLUCOSE BLOOD TEST: CPT

## 2018-06-12 RX ORDER — SACCHAROMYCES BOULARDII 250 MG
250 CAPSULE ORAL 2 TIMES DAILY
Qty: 28 CAPSULE | Refills: 0 | Status: SHIPPED | OUTPATIENT
Start: 2018-06-12 | End: 2018-06-26

## 2018-06-12 RX ORDER — SACCHAROMYCES BOULARDII 250 MG
250 CAPSULE ORAL 2 TIMES DAILY
Status: DISCONTINUED | OUTPATIENT
Start: 2018-06-12 | End: 2018-06-12

## 2018-06-12 RX ORDER — POTASSIUM CHLORIDE 20 MEQ/1
40 TABLET, EXTENDED RELEASE ORAL ONCE
Status: COMPLETED | OUTPATIENT
Start: 2018-06-12 | End: 2018-06-12

## 2018-06-12 NOTE — PROGRESS NOTES
[facility] AND [facility] Infectious Disease  Progress Note    Reina Medley Patient Status:  Inpatient    10/8/1935 MRN [id number]   Location [facility] 5SW/SE Attending Sondra Dye MD   Hosp Day # 5 PCP Norma Lopez MD     Subjective:  Patient although this is not certain. Bladder wall thickening, nonspecific may reflect underdistention or cystitis. No gross bladder mass/lesion or intravesical calculus. There is mild perivesical fat infiltration consistent with infection and/or inflammation.   3.

## 2018-06-12 NOTE — CM/SW NOTE
RN/Arabella informed SW that anticipated discharge today and pt will need ambulance transport (complete, dementia, wallis, +ESBL).  EDWIN spoke w/ Mimi from [facility] and arranged ambulance transport to [facility] at 1pm.     EDWIN spoke w/ Jagdish Coyle from [facility] who stat

## 2018-06-12 NOTE — DISCHARGE SUMMARY
General Medicine Discharge Summary     Patient ID:  Colton Shell  80year old  10/8/1935    Admit date: 6/7/2018    Discharge date and time: 6/12/18    Attending Physician: Jaz Rivas MD     Consults: IP CONSULT TO HOSPITALIST  IP CONSULT TO INFECTIOUS UTI. Reportedly with AMS fm bsl dementia w/ PD- AAO x 1-2 on exam this AM- self, initially believed he was in [facility], year 2017 but agreed 2018 when corrected.        Hospital Course:   Patient is a 80year old male with PMH sig for Parkinsons, IDDM, Procedures:      Imaging:         Disposition: SNF    Activity: activity as tolerated  Diet: regular diet  Wound Care: as directed  Code Status: Full Code  O2: none    Home Medication Changes:     Med list     Medication List      START taking these medica 250 MG Caps  · sodium chloride 0.9% SOLN 100 mL with Meropenem 500 MG SOLR 500 mg         FU  Follow-up Information     Ry Lopez DO. Schedule an appointment as soon as possible for a visit in 10 days.     Specialties:  INFECTIOUS DISEASES, Inte PROSCAR     Fluticasone Propionate 50 MCG/ACT Susp  Commonly known as:  FLONASE     gabapentin 600 MG Tabs  Commonly known as:  NEURONTIN     magnesium hydroxide 400 MG/5ML Susp  Commonly known as:  MILK OF MAGNESIA     MIRALAX Powd  Generic drug:  Polyet

## 2018-06-12 NOTE — PLAN OF CARE
Problem: Diabetes/Glucose Control  Goal: Glucose maintained within prescribed range  INTERVENTIONS:  - Monitor Blood Glucose as ordered  - Assess for signs and symptoms of hyperglycemia and hypoglycemia  - Administer ordered medications to maintain glucose Progressing      Problem: SAFETY ADULT - FALL  Goal: Free from fall injury  INTERVENTIONS:  - Assess pt frequently for physical needs  - Identify cognitive and physical deficits and behaviors that affect risk of falls.   - Post fall precautions as josselyn

## 2018-09-28 ENCOUNTER — APPOINTMENT (OUTPATIENT)
Dept: CT IMAGING | Facility: HOSPITAL | Age: 83
End: 2018-09-28
Attending: EMERGENCY MEDICINE
Payer: MEDICARE

## 2018-09-28 ENCOUNTER — HOSPITAL ENCOUNTER (EMERGENCY)
Facility: HOSPITAL | Age: 83
Discharge: HOME | End: 2018-09-28
Attending: EMERGENCY MEDICINE
Payer: MEDICARE

## 2018-09-28 VITALS
WEIGHT: 260 LBS | SYSTOLIC BLOOD PRESSURE: 169 MMHG | HEIGHT: 72 IN | DIASTOLIC BLOOD PRESSURE: 67 MMHG | TEMPERATURE: 98 F | OXYGEN SATURATION: 98 % | RESPIRATION RATE: 18 BRPM | BODY MASS INDEX: 35.21 KG/M2 | HEART RATE: 57 BPM

## 2018-09-28 DIAGNOSIS — S09.8XXA BLUNT HEAD TRAUMA, INITIAL ENCOUNTER: Primary | ICD-10-CM

## 2018-09-28 DIAGNOSIS — S01.01XA LACERATION OF SCALP, INITIAL ENCOUNTER: ICD-10-CM

## 2018-09-28 PROCEDURE — 99284 EMERGENCY DEPT VISIT MOD MDM: CPT

## 2018-09-28 PROCEDURE — 12001 RPR S/N/AX/GEN/TRNK 2.5CM/<: CPT

## 2018-09-28 PROCEDURE — 70450 CT HEAD/BRAIN W/O DYE: CPT | Performed by: EMERGENCY MEDICINE

## 2018-09-28 PROCEDURE — 90471 IMMUNIZATION ADMIN: CPT

## 2018-09-28 RX ORDER — BISACODYL 10 MG
10 SUPPOSITORY, RECTAL RECTAL DAILY
COMMUNITY

## 2018-09-28 RX ORDER — BACITRACIN ZINC AND POLYMYXIN B SULFATE 500; 10000 [USP'U]/G; [USP'U]/G
OINTMENT TOPICAL 3 TIMES DAILY
COMMUNITY

## 2018-09-28 RX ORDER — TIZANIDINE 4 MG/1
4 TABLET ORAL EVERY 6 HOURS PRN
COMMUNITY

## 2018-09-28 RX ORDER — ACETAMINOPHEN 325 MG/1
650 TABLET ORAL ONCE
Status: COMPLETED | OUTPATIENT
Start: 2018-09-28 | End: 2018-09-28

## 2018-09-28 RX ORDER — DOCUSATE SODIUM 100 MG/1
100 CAPSULE, LIQUID FILLED ORAL 2 TIMES DAILY
COMMUNITY

## 2018-09-28 RX ORDER — SENNA PLUS 8.6 MG/1
1 TABLET ORAL DAILY
COMMUNITY

## 2018-09-29 NOTE — ED INITIAL ASSESSMENT (HPI)
Pt arrived per EMS. EMS stated pt hit his head on dilan lift during transfer. Has laceration on top of head and bump on forehead. Denies anticoagulant use. Facility requesting CT scan.

## 2018-09-29 NOTE — ED PROVIDER NOTES
Patient Seen in: [facility] AND [facility] Emergency Department    History   Patient presents with:  Laceration Abrasion (integumentary)    Stated Complaint: head laceration    HPI    49-year-old male with past medical history significant for depression, hypothy CERVICAL/THORACIC      Comment:  Procedure: CERVICAL EPIDURAL INJECTION;  Surgeon:                Noemi Higginbotham MD;  Location: [address] [place] [address]  No date: OTHER SURGICAL HISTORY      Comment:  bleeding ulcer treated  4/19/16: OTHER SURGICAL HISTOR 35.26 kg/m²         Physical Exam    Physical Exam   Constitutional: AAOx3, well nourished, NAD, obese  Head: Normocephalic superior scalp laceration with 2-1/2 cm, frontal contusion approximately 2 x 3 cm with minimal tenderness to palpation.    Ears: TM's diagnosis)  Laceration of scalp, initial encounter    Disposition:  Discharge  9/28/2018  8:51 pm    Follow-up:  Willie Rowley MD  [place] [address]  [phone]    In 2 days  As needed    We recommend that you schedule follow

## 2019-04-25 ENCOUNTER — HOSPITAL ENCOUNTER (OUTPATIENT)
Dept: CT IMAGING | Facility: HOSPITAL | Age: 84
Discharge: HOME | End: 2019-04-25
Attending: INTERNAL MEDICINE
Payer: MEDICARE

## 2019-04-25 DIAGNOSIS — J32.9 SINUSITIS: ICD-10-CM

## 2019-04-25 PROCEDURE — 70486 CT MAXILLOFACIAL W/O DYE: CPT | Performed by: INTERNAL MEDICINE

## 2019-06-10 NOTE — ED AVS SNAPSHOT
Noelraj Banuelos   MRN: [id number]    Department:  [facility] Emergency Department   Date of Visit:  6/6/2018           Disclosure     Insurance plans vary and the physician(s) referred by the ER may not be covered by your plan.  Please contact [place] [address] [address] [facility] [address]  [phone]  Dept: [phone]    PRE-OP EVALUATION:  Today's date: 6/10/2019    Tracy Mcclelland (: 1944) presents for pre-operative evaluation assessment as requested by Dr. Jefferson.  She requires evaluation and anesthesia risk assessment prior to undergoing surgery/procedure for treatment of neck pain .    Proposed Surgery/ Procedure: neck fusion C5, C6  Date of Surgery/ Procedure: 19  Time of Surgery/ Procedure: arrive at 0800, surgery at 1000  Hospital/Surgical Facility: [facility]  Primary Physician: Skylar Gomez  Type of Anesthesia Anticipated: General    Patient has a Health Care Directive or Living Will:  NO    1. NO - Do you have a history of heart attack, stroke, stent, bypass or surgery on an artery in the head, neck, heart or legs?  2. NO - Do you ever have any pain or discomfort in your chest?  3. NO - Do you have a history of  Heart Failure?  4. YES - ARE YOUR TROUBLED BY SHORTNESS OF BREATH WHEN WALKING ON THE LEVEL, UP A SLIGHT HILL OR AT NIGHT? At baseline, chronic  5. NO - Do you currently have a cold, bronchitis or other respiratory infection?  6. NO - Do you have a cough, shortness of breath or wheezing?  7. NO - Do you sometimes get pains in the calves of your legs when you walk?  8. NO - Do you or anyone in your family have previous history of blood clots?  9. NO - Do you or does anyone in your family have a serious bleeding problem such as prolonged bleeding following surgeries or cuts?  10. NO - Have you ever had problems with anemia or been told to take iron pills?  11. NO - Have you had any abnormal blood loss such as black, tarry or bloody stools, or abnormal vaginal bleeding?  12. NO - Have you ever had a blood transfusion?  13. NO - Have you or any of your relatives ever had problems with anesthesia?  14. NO - Do you have sleep apnea, excessive snoring or daytime drowsiness?  15. NO - Do you have any  prosthetic heart valves?  16. YES - DO YOU HAVE PROSTHETIC JOINTS?   17. NO - Is there any chance that you may be pregnant?      HPI:     HPI related to upcoming procedure: chronic neck pain      See problem list for active medical problems.  Problems all longstanding and stable, except as noted/documented.  See ROS for pertinent symptoms related to these conditions.      MEDICAL HISTORY:     Patient Active Problem List    Diagnosis Date Noted     Status post total hip replacement, right 10/16/2018     Priority: Medium     Primary osteoarthritis of right hip 07/02/2018     Priority: Medium     CKD (chronic kidney disease) stage 2, GFR 60-89 ml/min 10/19/2015     Priority: Medium     Hereditary and idiopathic peripheral neuropathy 07/02/2015     Priority: Medium     Problem list name updated by automated process. Provider to review       Granuloma annulare 08/07/2014     Priority: Medium     Right dorsal hand       Hypertension, goal below 140/90 07/17/2012     Priority: Medium     Type 2 diabetes mellitus with diabetic chronic kidney disease (H) 11/02/2011     Priority: Medium     Tobacco use disorder 10/07/2010     Priority: Medium     Tennis Elbow-left 07/28/2010     Priority: Medium     Microalbuminuria 01/29/2010     Priority: Medium     Hyperlipidemia with target LDL less than 100 01/28/2010     Priority: Medium     Diagnosis updated by automated process. Provider to review and confirm.        Past Medical History:   Diagnosis Date     CKD (chronic kidney disease) stage 2, GFR 60-89 ml/min 10/19/2015     Headache(784.0)     hx.of Migraines     Lumbago      Type II or unspecified type diabetes mellitus without mention of complication, not stated as uncontrolled      Unspecified essential hypertension      Past Surgical History:   Procedure Laterality Date     ARTHROPLASTY HIP Right 10/16/2018    Procedure: right total hip arthroplasty;  Surgeon: Terrell Ervin DO;  Location:  OR     C LIGATE  within the next three months to obtain basic health screening including reassessment of your blood pressure.     IF THERE IS ANY CHANGE OR WORSENING OF YOUR CONDITION, CALL YOUR PRIMARY CARE PHYSICIAN AT ONCE OR RETURN IMMEDIATELY TO THE EMERGENCY DEPARTMEN FALLOPIAN TUBE       HC LAPAROSCOPY, SURGICAL; CHOLECYSTECTOMY  2000    Cholecystectomy, Laparoscopic     HC REMOVE TONSILS/ADENOIDS,<11 Y/O      T & A <12y.o.     INJECT EPIDURAL CERVICAL N/A 5/17/2019    Procedure: Epidural Steroid Injection Bilateral Cervical 6-7;  Surgeon: Carlos Avendaño MD;  Location: [facility] OR     Current Outpatient Medications   Medication Sig Dispense Refill     acetaminophen (TYLENOL) 325 MG tablet Take 3 tablets (975 mg) by mouth every 8 hours as needed for mild pain 100 tablet 1     aspirin (ASA) 81 MG tablet Take 1 tablet (81 mg) by mouth daily       gabapentin (NEURONTIN) 600 MG tablet TAKE TWO TABLETS BY MOUTH THREE TIMES A  tablet 1     lisinopril (PRINIVIL/ZESTRIL) 20 MG tablet TAKE ONE TABLET BY MOUTH ONCE DAILY 90 tablet 0     loratadine (CLARITIN) 10 MG tablet Take 1 tablet (10 mg) by mouth daily 30 tablet 1     metFORMIN (GLUCOPHAGE) 500 MG tablet TAKE ONE TABLET BY MOUTH TWICE A DAY WITH MEALS 180 tablet 0     simvastatin (ZOCOR) 5 MG tablet TAKE ONE TABLET BY MOUTH EVERY NIGHT AT BEDTIME 90 tablet 0     OTC products: ASA, Tylenol    Allergies   Allergen Reactions     Mold      sneezing, coughing , runny nose, watery eyes & red,     No Known Drug Allergies       Latex Allergy: NO    Social History     Tobacco Use     Smoking status: Current Every Day Smoker     Packs/day: 0.25     Years: 20.00     Pack years: 5.00     Types: Cigarettes     Smokeless tobacco: Never Used     Tobacco comment: 2-3 cigs daily   Substance Use Topics     Alcohol use: Yes     Comment: couple times monthly     History   Drug Use No       REVIEW OF SYSTEMS:   CONSTITUTIONAL: NEGATIVE for fever, chills, change in weight  INTEGUMENTARY/SKIN: NEGATIVE for worrisome rashes, moles or lesions  EYES: NEGATIVE for vision changes or irritation  ENT/MOUTH: NEGATIVE for ear, mouth and throat problems  RESP: NEGATIVE for significant cough or SOB  BREAST: NEGATIVE for masses, tenderness or discharge  CV: NEGATIVE  "for chest pain, palpitations or peripheral edema  GI: NEGATIVE for nausea, abdominal pain, heartburn, or change in bowel habits  : NEGATIVE for frequency, dysuria, or hematuria  MUSCULOSKELETAL: NEGATIVE for significant arthralgias or myalgia  NEURO: NEGATIVE for weakness, dizziness or paresthesias  ENDOCRINE: NEGATIVE for temperature intolerance, skin/hair changes  HEME: NEGATIVE for bleeding problems  PSYCHIATRIC: NEGATIVE for changes in mood or affect    EXAM:   /60   Pulse 104   Temp 98.3  F (36.8  C) (Temporal)   Resp 16   Ht 1.607 m (5' 3.25\")   Wt 73.5 kg (162 lb)   LMP  (LMP Unknown)   SpO2 95%   BMI 28.47 kg/m      GENERAL APPEARANCE: alert, no distress and elderly      EYES: EOMI, PERRL     HENT: ear canals and TM's normal and nose and mouth without ulcers or lesions     NECK: no adenopathy, no asymmetry or masses      RESP: lungs clear to auscultation - no rales, rhonchi or wheezes     CV: regular rates and rhythm, normal S1 S2, no S3 or S4 and no murmur, click or rub     ABDOMEN:  soft, nontender, no HSM or masses and bowel sounds normal     MS: extremities normal- no gross deformities noted, no evidence of inflammation in joints, FROM in all extremities.     SKIN: no suspicious lesions or rashes     NEURO: Normal strength and tone, sensory exam grossly normal, mentation intact and speech normal     PSYCH: mentation appears normal. and affect normal/bright     LYMPHATICS: No cervical adenopathy    DIAGNOSTICS:   EKG from 10/3/18:  NSR, no acute ST/T changes c/w ischemia    Recent Labs   Lab Test 04/10/19  1018 03/21/19  1216 10/18/18  0630 10/17/18  0556 10/03/18  1106   HGB  --   --  11.1* 10.3* 15.3   PLT  --   --   --   --  221     --   --   --  137   POTASSIUM 4.7  --   --   --  4.2   CR 0.76  --   --   --  0.72   A1C  --  6.9*  --   --  7.1*      Office Visit on 06/10/2019   Component Date Value Ref Range Status     Hemoglobin A1C 06/10/2019 7.8* 0 - 5.6 % Final    Comment: " Normal <5.7% Prediabetes 5.7-6.4%  Diabetes 6.5% or higher - adopted from ADA   consensus guidelines.       TSH 06/10/2019 1.49  0.40 - 4.00 mU/L Final         IMPRESSION:   Reason for surgery/procedure: chronic neck pain     The proposed surgical procedure is considered INTERMEDIATE risk.    REVISED CARDIAC RISK INDEX  The patient has the following serious cardiovascular risks for perioperative complications such as (MI, PE, VFib and 3  AV Block):  No serious cardiac risks  INTERPRETATION: 0 risks: Class I (very low risk - 0.4% complication rate)    The patient has the following additional risks for perioperative complications:  No identified additional risks      ICD-10-CM    1. Preop general physical exam Z01.818    2. Chronic neck pain M54.2     G89.29    3. Type 2 diabetes mellitus with chronic kidney disease, without long-term current use of insulin, unspecified CKD stage (H) E11.22 Hemoglobin A1c     TSH with free T4 reflex       RECOMMENDATIONS:         --Patient is to take all scheduled medications on the day of surgery EXCEPT for modifications listed below.    Diabetes Medication Use  -----Hold usual oral and non-insulin diabetic meds (e.g. Metformin, Actos, Glipizide) while NPO.       ACE Inhibitor or Angiotensin Receptor Blocker (ARB) Use  Ace inhibitor or Angiotensin Receptor Blocker (ARB) and should HOLD this medication for the 24 hours prior to surgery.      APPROVAL GIVEN to proceed with proposed procedure, without further diagnostic evaluation       Signed Electronically by: MACHO Cifuentes CNP    Copy of this evaluation report is provided to requesting physician.    [facility] Preop Guidelines    Revised Cardiac Risk Index

## 2020-03-04 ENCOUNTER — APPOINTMENT (OUTPATIENT)
Dept: GENERAL RADIOLOGY | Facility: HOSPITAL | Age: 85
DRG: 683 | End: 2020-03-04
Attending: EMERGENCY MEDICINE
Payer: MEDICARE

## 2020-03-04 ENCOUNTER — HOSPITAL ENCOUNTER (INPATIENT)
Facility: HOSPITAL | Age: 85
LOS: 4 days | Discharge: NURSING, REHAB OR LONG-TERM CARE | DRG: 683 | End: 2020-03-08
Attending: EMERGENCY MEDICINE | Admitting: HOSPITALIST
Payer: MEDICARE

## 2020-03-04 DIAGNOSIS — N17.9 AKI (ACUTE KIDNEY INJURY) (HCC): Primary | ICD-10-CM

## 2020-03-04 LAB
ADENOVIRUS PCR:: NEGATIVE
ANION GAP SERPL CALC-SCNC: 4 MMOL/L (ref 0–18)
B PERT DNA SPEC QL NAA+PROBE: NEGATIVE
BASOPHILS # BLD AUTO: 0 X10(3) UL (ref 0–0.2)
BASOPHILS # BLD AUTO: 0.01 X10(3) UL (ref 0–0.2)
BASOPHILS NFR BLD AUTO: 0 %
BASOPHILS NFR BLD AUTO: 0.2 %
BUN BLD-MCNC: 40 MG/DL (ref 7–18)
BUN/CREAT SERPL: 18 (ref 10–20)
C PNEUM DNA SPEC QL NAA+PROBE: NEGATIVE
CALCIUM BLD-MCNC: 8.3 MG/DL (ref 8.5–10.1)
CHLORIDE SERPL-SCNC: 108 MMOL/L (ref 98–112)
CO2 SERPL-SCNC: 26 MMOL/L (ref 21–32)
CORONAVIRUS 229E PCR:: NEGATIVE
CORONAVIRUS HKU1 PCR:: NEGATIVE
CORONAVIRUS NL63 PCR:: NEGATIVE
CORONAVIRUS OC43 PCR:: NEGATIVE
CREAT BLD-MCNC: 2.22 MG/DL (ref 0.7–1.3)
DEPRECATED RDW RBC AUTO: 57.1 FL (ref 35.1–46.3)
DEPRECATED RDW RBC AUTO: 58.1 FL (ref 35.1–46.3)
EOSINOPHIL # BLD AUTO: 0 X10(3) UL (ref 0–0.7)
EOSINOPHIL # BLD AUTO: 0 X10(3) UL (ref 0–0.7)
EOSINOPHIL NFR BLD AUTO: 0 %
EOSINOPHIL NFR BLD AUTO: 0 %
ERYTHROCYTE [DISTWIDTH] IN BLOOD BY AUTOMATED COUNT: 16.4 % (ref 11–15)
ERYTHROCYTE [DISTWIDTH] IN BLOOD BY AUTOMATED COUNT: 16.7 % (ref 11–15)
FLUAV RNA SPEC QL NAA+PROBE: NEGATIVE
FLUBV RNA SPEC QL NAA+PROBE: NEGATIVE
GLUCOSE BLD-MCNC: 120 MG/DL (ref 70–99)
HCT VFR BLD AUTO: 34.3 % (ref 39–53)
HCT VFR BLD AUTO: 35.8 % (ref 39–53)
HGB BLD-MCNC: 10.8 G/DL (ref 13–17.5)
HGB BLD-MCNC: 11.2 G/DL (ref 13–17.5)
IMM GRANULOCYTES # BLD AUTO: 0.03 X10(3) UL (ref 0–1)
IMM GRANULOCYTES # BLD AUTO: 0.03 X10(3) UL (ref 0–1)
IMM GRANULOCYTES NFR BLD: 0.5 %
IMM GRANULOCYTES NFR BLD: 0.5 %
LYMPHOCYTES # BLD AUTO: 0.9 X10(3) UL (ref 1–4)
LYMPHOCYTES # BLD AUTO: 1.04 X10(3) UL (ref 1–4)
LYMPHOCYTES NFR BLD AUTO: 15.1 %
LYMPHOCYTES NFR BLD AUTO: 16.6 %
MCH RBC QN AUTO: 29.6 PG (ref 26–34)
MCH RBC QN AUTO: 29.7 PG (ref 26–34)
MCHC RBC AUTO-ENTMCNC: 31.3 G/DL (ref 31–37)
MCHC RBC AUTO-ENTMCNC: 31.5 G/DL (ref 31–37)
MCV RBC AUTO: 94.2 FL (ref 80–100)
MCV RBC AUTO: 94.5 FL (ref 80–100)
METAPNEUMOVIRUS PCR:: NEGATIVE
MONOCYTES # BLD AUTO: 0.26 X10(3) UL (ref 0.1–1)
MONOCYTES # BLD AUTO: 0.31 X10(3) UL (ref 0.1–1)
MONOCYTES NFR BLD AUTO: 4.3 %
MONOCYTES NFR BLD AUTO: 5 %
MRSA DNA SPEC QL NAA+PROBE: POSITIVE
MYCOPLASMA PNEUMONIA PCR:: NEGATIVE
NEUTROPHILS # BLD AUTO: 4.78 X10 (3) UL (ref 1.5–7.7)
NEUTROPHILS # BLD AUTO: 4.78 X10(3) UL (ref 1.5–7.7)
NEUTROPHILS # BLD AUTO: 4.87 X10 (3) UL (ref 1.5–7.7)
NEUTROPHILS # BLD AUTO: 4.87 X10(3) UL (ref 1.5–7.7)
NEUTROPHILS NFR BLD AUTO: 77.9 %
NEUTROPHILS NFR BLD AUTO: 79.9 %
OSMOLALITY SERPL CALC.SUM OF ELEC: 297 MOSM/KG (ref 275–295)
PARAINFLUENZA 1 PCR:: NEGATIVE
PARAINFLUENZA 2 PCR:: NEGATIVE
PARAINFLUENZA 3 PCR:: NEGATIVE
PARAINFLUENZA 4 PCR:: NEGATIVE
PLATELET # BLD AUTO: 169 10(3)UL (ref 150–450)
PLATELET # BLD AUTO: 183 10(3)UL (ref 150–450)
POTASSIUM SERPL-SCNC: 4.8 MMOL/L (ref 3.5–5.1)
RBC # BLD AUTO: 3.64 X10(6)UL (ref 3.8–5.8)
RBC # BLD AUTO: 3.79 X10(6)UL (ref 3.8–5.8)
RHINOVIRUS/ENTERO PCR:: NEGATIVE
RSV RNA SPEC QL NAA+PROBE: NEGATIVE
SODIUM SERPL-SCNC: 138 MMOL/L (ref 136–145)
TROPONIN I SERPL-MCNC: <0.045 NG/ML (ref ?–0.04)
WBC # BLD AUTO: 6 X10(3) UL (ref 4–11)
WBC # BLD AUTO: 6.3 X10(3) UL (ref 4–11)

## 2020-03-04 PROCEDURE — 71045 X-RAY EXAM CHEST 1 VIEW: CPT | Performed by: EMERGENCY MEDICINE

## 2020-03-04 PROCEDURE — 87798 DETECT AGENT NOS DNA AMP: CPT | Performed by: HOSPITALIST

## 2020-03-04 PROCEDURE — 87641 MR-STAPH DNA AMP PROBE: CPT | Performed by: EMERGENCY MEDICINE

## 2020-03-04 PROCEDURE — 84484 ASSAY OF TROPONIN QUANT: CPT | Performed by: EMERGENCY MEDICINE

## 2020-03-04 PROCEDURE — 87633 RESP VIRUS 12-25 TARGETS: CPT | Performed by: HOSPITALIST

## 2020-03-04 PROCEDURE — 93005 ELECTROCARDIOGRAM TRACING: CPT

## 2020-03-04 PROCEDURE — 87486 CHLMYD PNEUM DNA AMP PROBE: CPT | Performed by: HOSPITALIST

## 2020-03-04 PROCEDURE — 80048 BASIC METABOLIC PNL TOTAL CA: CPT | Performed by: EMERGENCY MEDICINE

## 2020-03-04 PROCEDURE — 93010 ELECTROCARDIOGRAM REPORT: CPT | Performed by: EMERGENCY MEDICINE

## 2020-03-04 PROCEDURE — 99285 EMERGENCY DEPT VISIT HI MDM: CPT

## 2020-03-04 PROCEDURE — 87581 M.PNEUMON DNA AMP PROBE: CPT | Performed by: HOSPITALIST

## 2020-03-04 PROCEDURE — 85025 COMPLETE CBC W/AUTO DIFF WBC: CPT | Performed by: EMERGENCY MEDICINE

## 2020-03-04 PROCEDURE — 85025 COMPLETE CBC W/AUTO DIFF WBC: CPT | Performed by: HOSPITALIST

## 2020-03-04 PROCEDURE — 96361 HYDRATE IV INFUSION ADD-ON: CPT

## 2020-03-04 PROCEDURE — 96360 HYDRATION IV INFUSION INIT: CPT

## 2020-03-04 RX ORDER — PANTOPRAZOLE SODIUM 20 MG/1
20 TABLET, DELAYED RELEASE ORAL
Status: DISCONTINUED | OUTPATIENT
Start: 2020-03-05 | End: 2020-03-08

## 2020-03-04 RX ORDER — ACETAMINOPHEN 325 MG/1
650 TABLET ORAL EVERY 6 HOURS PRN
Status: DISCONTINUED | OUTPATIENT
Start: 2020-03-04 | End: 2020-03-08

## 2020-03-04 RX ORDER — SENNOSIDES 8.6 MG
8.6 TABLET ORAL DAILY
Status: DISCONTINUED | OUTPATIENT
Start: 2020-03-04 | End: 2020-03-08

## 2020-03-04 RX ORDER — SENNA AND DOCUSATE SODIUM 50; 8.6 MG/1; MG/1
1 TABLET, FILM COATED ORAL DAILY
COMMUNITY

## 2020-03-04 RX ORDER — DIPHENHYDRAMINE HCL 25 MG
25 CAPSULE ORAL EVERY 6 HOURS PRN
Status: ON HOLD | COMMUNITY
End: 2020-03-07

## 2020-03-04 RX ORDER — SODIUM CHLORIDE 0.9 % (FLUSH) 0.9 %
3 SYRINGE (ML) INJECTION AS NEEDED
Status: DISCONTINUED | OUTPATIENT
Start: 2020-03-04 | End: 2020-03-08

## 2020-03-04 RX ORDER — ASPIRIN 81 MG/1
81 TABLET ORAL DAILY
Status: DISCONTINUED | OUTPATIENT
Start: 2020-03-05 | End: 2020-03-08

## 2020-03-04 RX ORDER — POLYETHYLENE GLYCOL 3350 17 G/17G
17 POWDER, FOR SOLUTION ORAL DAILY PRN
Status: DISCONTINUED | OUTPATIENT
Start: 2020-03-04 | End: 2020-03-08

## 2020-03-04 RX ORDER — ATORVASTATIN CALCIUM 20 MG/1
20 TABLET, FILM COATED ORAL NIGHTLY
COMMUNITY

## 2020-03-04 RX ORDER — IPRATROPIUM BROMIDE AND ALBUTEROL SULFATE 2.5; .5 MG/3ML; MG/3ML
3 SOLUTION RESPIRATORY (INHALATION)
COMMUNITY

## 2020-03-04 RX ORDER — HEPARIN SODIUM 5000 [USP'U]/ML
5000 INJECTION, SOLUTION INTRAVENOUS; SUBCUTANEOUS EVERY 8 HOURS SCHEDULED
Status: DISCONTINUED | OUTPATIENT
Start: 2020-03-04 | End: 2020-03-08

## 2020-03-04 RX ORDER — DOCUSATE SODIUM 100 MG/1
100 CAPSULE, LIQUID FILLED ORAL 2 TIMES DAILY
Status: DISCONTINUED | OUTPATIENT
Start: 2020-03-04 | End: 2020-03-04

## 2020-03-04 RX ORDER — GABAPENTIN 600 MG/1
600 TABLET ORAL NIGHTLY
Status: DISCONTINUED | OUTPATIENT
Start: 2020-03-04 | End: 2020-03-08

## 2020-03-04 RX ORDER — ALLOPURINOL 300 MG/1
300 TABLET ORAL DAILY
Status: DISCONTINUED | OUTPATIENT
Start: 2020-03-05 | End: 2020-03-08

## 2020-03-04 RX ORDER — PREDNISONE 10 MG/1
10 TABLET ORAL DAILY
COMMUNITY

## 2020-03-04 RX ORDER — MONTELUKAST SODIUM 10 MG/1
10 TABLET ORAL NIGHTLY
Status: DISCONTINUED | OUTPATIENT
Start: 2020-03-04 | End: 2020-03-08

## 2020-03-04 RX ORDER — FINASTERIDE 5 MG/1
5 TABLET, FILM COATED ORAL DAILY
Status: DISCONTINUED | OUTPATIENT
Start: 2020-03-05 | End: 2020-03-08

## 2020-03-04 RX ORDER — FUROSEMIDE 20 MG/1
20 TABLET ORAL 2 TIMES DAILY
Status: ON HOLD | COMMUNITY
End: 2020-03-07

## 2020-03-04 RX ORDER — BISACODYL 10 MG
10 SUPPOSITORY, RECTAL RECTAL
Status: DISCONTINUED | OUTPATIENT
Start: 2020-03-04 | End: 2020-03-08

## 2020-03-04 RX ORDER — MELATONIN
6 NIGHTLY
COMMUNITY

## 2020-03-04 RX ORDER — DOXYCYCLINE HYCLATE 100 MG/1
100 CAPSULE ORAL 2 TIMES DAILY
COMMUNITY

## 2020-03-04 RX ORDER — DONEPEZIL HYDROCHLORIDE 10 MG/1
10 TABLET, FILM COATED ORAL DAILY
Status: DISCONTINUED | OUTPATIENT
Start: 2020-03-05 | End: 2020-03-08

## 2020-03-04 RX ORDER — SODIUM CHLORIDE 9 MG/ML
INJECTION, SOLUTION INTRAVENOUS CONTINUOUS
Status: ACTIVE | OUTPATIENT
Start: 2020-03-04 | End: 2020-03-05

## 2020-03-04 RX ORDER — ATORVASTATIN CALCIUM 20 MG/1
20 TABLET, FILM COATED ORAL NIGHTLY
Status: DISCONTINUED | OUTPATIENT
Start: 2020-03-04 | End: 2020-03-08

## 2020-03-04 RX ORDER — ONDANSETRON 2 MG/ML
4 INJECTION INTRAMUSCULAR; INTRAVENOUS EVERY 6 HOURS PRN
Status: DISCONTINUED | OUTPATIENT
Start: 2020-03-04 | End: 2020-03-08

## 2020-03-04 RX ORDER — MONTELUKAST SODIUM 10 MG/1
10 TABLET ORAL NIGHTLY
COMMUNITY

## 2020-03-04 RX ORDER — DOCUSATE SODIUM 100 MG/1
100 CAPSULE, LIQUID FILLED ORAL 2 TIMES DAILY
Status: DISCONTINUED | OUTPATIENT
Start: 2020-03-04 | End: 2020-03-08

## 2020-03-04 RX ORDER — MIRTAZAPINE 30 MG/1
30 TABLET, FILM COATED ORAL NIGHTLY
COMMUNITY

## 2020-03-04 RX ORDER — METOCLOPRAMIDE HYDROCHLORIDE 5 MG/ML
5 INJECTION INTRAMUSCULAR; INTRAVENOUS EVERY 8 HOURS PRN
Status: DISCONTINUED | OUTPATIENT
Start: 2020-03-04 | End: 2020-03-08

## 2020-03-04 RX ORDER — ALBUTEROL SULFATE 2.5 MG/3ML
2.5 SOLUTION RESPIRATORY (INHALATION) EVERY 4 HOURS PRN
COMMUNITY

## 2020-03-04 RX ORDER — MIRTAZAPINE 30 MG/1
30 TABLET, FILM COATED ORAL NIGHTLY
Status: DISCONTINUED | OUTPATIENT
Start: 2020-03-04 | End: 2020-03-08

## 2020-03-04 RX ORDER — BUPROPION HYDROCHLORIDE 150 MG/1
150 TABLET ORAL DAILY
Status: DISCONTINUED | OUTPATIENT
Start: 2020-03-05 | End: 2020-03-08

## 2020-03-04 RX ORDER — TAMSULOSIN HYDROCHLORIDE 0.4 MG/1
0.4 CAPSULE ORAL DAILY
Status: DISCONTINUED | OUTPATIENT
Start: 2020-03-05 | End: 2020-03-08

## 2020-03-04 RX ORDER — PREDNISONE 10 MG/1
10 TABLET ORAL DAILY
Status: DISCONTINUED | OUTPATIENT
Start: 2020-03-05 | End: 2020-03-08

## 2020-03-04 RX ORDER — BISACODYL 10 MG
10 SUPPOSITORY, RECTAL RECTAL DAILY
Status: DISCONTINUED | OUTPATIENT
Start: 2020-03-05 | End: 2020-03-08

## 2020-03-04 NOTE — H&P
[facility] Hospitalist History and Physical      Patient presents with:  Arrythmia/Palpitations  Fatigue       PCP: Jagdish Wiley MD      History of Present Illness: Patient is a 80year old male with PMH sig for MDD, hypothyroidism, morbid obesity, HL, eHTN, an OBJECTIVE:  /69 (BP Location: Right arm)   Pulse 56   Temp 97.6 °F (36.4 °C) (Oral)   Resp 23   Ht 6' (1.829 m)   Wt (!) 311 lb (141.1 kg)   SpO2 96%   BMI 42.18 kg/m²   General:  Alert, no distress, appears stated age.     Head:  Normocephalic, w weakness x3 days. History of hypertension. TECHNIQUE:   Single view. FINDINGS:   CARDIAC/MEDIASTINUM: The cardiac silhouette is enlarged and unchanged. There is atherosclerotic calcification of the aortic arch and thoracic aorta.  LUNGS: The lungs are with patient and coordinating care. Greater than 50% face to face encounter.       Puja Douglas MD  [facility] Hospitalist  [phone]

## 2020-03-04 NOTE — PROGRESS NOTES
Gave telephone report to [facility], patient A&OX4, denies any pain, follows commands answers all questions appropriately. On a renal diet, ate lunch, needs set up but able to feed self, incontinent of urine and stool.  Skin is c/d/i  On isolation precaution

## 2020-03-04 NOTE — ED INITIAL ASSESSMENT (HPI)
Pt received via EMS from [facility] for eval of weakness. Per medics, NH staff called because pt was more lethargic than normal and HR was in the 50s. Pt A&O x 3 at this time. Pt c/o feeling fatigue and being hungry. O2 dependant.

## 2020-03-04 NOTE — PROGRESS NOTES
[facility] Pharmacy Note:  Renal Dose Adjustment for Metoclopramide (REGLAN)    Rodri Goldsmith has been prescribed Metoclopramide (REGLAN) 10 mg every 8 hours as needed for nausea and vomiting.     Estimated Creatinine Clearance: 27.2 mL/min (A) (based on SCr of

## 2020-03-04 NOTE — ED PROVIDER NOTES
Patient Seen in: [facility] AND [facility] Emergency Department      History   Patient presents with:  Arrythmia/Palpitations  Fatigue    Stated Complaint: Weakness    HPI    30-year-old male with history of depression, hypertension, hyperlipidemia, hypothyroidi diarrhea, nausea and vomiting. Genitourinary: Negative for dysuria, flank pain and frequency. Musculoskeletal: Negative for back pain. Skin: Negative for rash. Neurological: Negative for weakness, light-headedness and headaches.    All other systems EKG    Rate, intervals and axes as noted on EKG Report.   Rate: 54  Rhythm: Sinus Rhythm with 1st degree AV block  Reading: abnormal for rate, abnormal for rhythm, nonspecific T wave abnormalities    Cardiac Monitor:    Patient placed on the cardiac mon 0.70 x10(3) uL    Basophil Absolute 0.01 0.00 - 0.20 x10(3) uL    Immature Granulocyte Absolute 0.03 0.00 - 1.00 x10(3) uL    Neutrophil % 79.9 %    Lymphocyte % 15.1 %    Monocyte % 4.3 %    Eosinophil % 0.0 %    Basophil % 0.2 %    Immature Granulocyte % file. to contribute to the complexity of his ED evaluation.       EMERGENCY DEPARTMENT MEDICAL DECISION MAKING:  After obtaining the patient's history, performing the physical exam and reviewing the diagnostics, multiple initial diagnoses were considered ba

## 2020-03-05 LAB
ANION GAP SERPL CALC-SCNC: 6 MMOL/L (ref 0–18)
BILIRUB UR QL: NEGATIVE
BUN BLD-MCNC: 37 MG/DL (ref 7–18)
BUN/CREAT SERPL: 23 (ref 10–20)
CALCIUM BLD-MCNC: 8 MG/DL (ref 8.5–10.1)
CHLORIDE SERPL-SCNC: 114 MMOL/L (ref 98–112)
CO2 SERPL-SCNC: 23 MMOL/L (ref 21–32)
COLOR UR: YELLOW
CREAT BLD-MCNC: 1.61 MG/DL (ref 0.7–1.3)
CREAT UR-SCNC: 129 MG/DL
GLUCOSE BLD-MCNC: 75 MG/DL (ref 70–99)
GLUCOSE UR-MCNC: NEGATIVE MG/DL
KETONES UR-MCNC: NEGATIVE MG/DL
NITRITE UR QL STRIP.AUTO: NEGATIVE
OSMOLALITY SERPL CALC.SUM OF ELEC: 303 MOSM/KG (ref 275–295)
OSMOLALITY UR: 882 MOSM/KG (ref 300–1100)
PH UR: 6 [PH] (ref 5–8)
POTASSIUM SERPL-SCNC: 4.3 MMOL/L (ref 3.5–5.1)
PROCALCITONIN SERPL-MCNC: 0.12 NG/ML
PROT UR-MCNC: 100 MG/DL
RBC #/AREA URNS AUTO: 540 /HPF
SODIUM SERPL-SCNC: 143 MMOL/L (ref 136–145)
SODIUM SERPL-SCNC: 153 MMOL/L
SP GR UR STRIP: 1.03 (ref 1–1.03)
UROBILINOGEN UR STRIP-ACNC: <2
WBC #/AREA URNS AUTO: 1839 /HPF

## 2020-03-05 PROCEDURE — 81001 URINALYSIS AUTO W/SCOPE: CPT | Performed by: EMERGENCY MEDICINE

## 2020-03-05 PROCEDURE — 83935 ASSAY OF URINE OSMOLALITY: CPT | Performed by: HOSPITALIST

## 2020-03-05 PROCEDURE — 87086 URINE CULTURE/COLONY COUNT: CPT | Performed by: EMERGENCY MEDICINE

## 2020-03-05 PROCEDURE — 84145 PROCALCITONIN (PCT): CPT | Performed by: HOSPITALIST

## 2020-03-05 PROCEDURE — 80048 BASIC METABOLIC PNL TOTAL CA: CPT | Performed by: HOSPITALIST

## 2020-03-05 PROCEDURE — 84300 ASSAY OF URINE SODIUM: CPT | Performed by: HOSPITALIST

## 2020-03-05 PROCEDURE — 87088 URINE BACTERIA CULTURE: CPT | Performed by: EMERGENCY MEDICINE

## 2020-03-05 PROCEDURE — 87186 SC STD MICRODIL/AGAR DIL: CPT | Performed by: EMERGENCY MEDICINE

## 2020-03-05 PROCEDURE — 82570 ASSAY OF URINE CREATININE: CPT | Performed by: HOSPITALIST

## 2020-03-05 RX ORDER — SODIUM CHLORIDE 9 MG/ML
INJECTION, SOLUTION INTRAVENOUS CONTINUOUS
Status: ACTIVE | OUTPATIENT
Start: 2020-03-05 | End: 2020-03-07

## 2020-03-05 NOTE — PROGRESS NOTES
[facility] Hospitalist Progress Note                                                                   [address] [facility]  10/8/1935    SUBJECTIVE/24 hour events:  -Feels improved today.   No new comp input(s): ALPHOS, TBIL, DBIL, TPROT    No results for input(s): PGLU in the last 168 hours.     Meds:   Scheduled:   • cefTRIAXone  1 g Intravenous Q24H   • docusate sodium  100 mg Oral BID   • Senna  8.6 mg Oral Daily   • allopurinol  300 mg Oral Daily   • Hospitalist  Pager: [phone]

## 2020-03-05 NOTE — RESPIRATORY THERAPY NOTE
RESPIRATORY THERAPY CPAP PROGRESS NOTE:    Patient is compliant with nightly CPAP: no    Patient refused: yes

## 2020-03-05 NOTE — PLAN OF CARE
Problem: Patient Centered Care  Goal: Patient preferences are identified and integrated in the patient's plan of care  Description  Interventions:  - What would you like us to know as we care for you?  I live at [facility]  - Provide timely, complete, and Instruct pt to call for assistance with activity based on assessment  - Modify environment to reduce risk of injury  - Provide assistive devices as appropriate  - Consider OT/PT consult to assist with strengthening/mobility  - Encourage toileting schedule (other measures as available)  - Encourage oral intake as appropriate  - Instruct patient on fluid and nutrition restrictions as appropriate  Outcome: Progressing     Problem: SKIN/TISSUE INTEGRITY - ADULT  Goal: Skin integrity remains intact  Description

## 2020-03-05 NOTE — PLAN OF CARE
Problem: PAIN - ADULT  Goal: Verbalizes/displays adequate comfort level or patient's stated pain goal  Description  INTERVENTIONS:  - Encourage pt to monitor pain and request assistance  - Assess pain using appropriate pain scale  - Administer analgesics measures for life threatening arrhythmias  - Monitor electrolytes and administer replacement therapy as ordered  Outcome: Progressing     Problem: RESPIRATORY - ADULT  Goal: Achieves optimal ventilation and oxygenation  Description  INTERVENTIONS:  - Asses

## 2020-03-05 NOTE — PLAN OF CARE
Problem: PAIN - ADULT  Goal: Verbalizes/displays adequate comfort level or patient's stated pain goal  Description  INTERVENTIONS:  - Encourage pt to monitor pain and request assistance  - Assess pain using appropriate pain scale  - Administer analgesics emergency measures for life threatening arrhythmias  - Monitor electrolytes and administer replacement therapy as ordered  Outcome: Progressing     Problem: RESPIRATORY - ADULT  Goal: Achieves optimal ventilation and oxygenation  Description  INTERVENTIONS

## 2020-03-05 NOTE — PHYSICAL THERAPY NOTE
Chart reviewed, discussed case with RN. Pt is from [facility] long term care, bed bound at baseline, mechanical lift to w/c by staff. Pt is not appropriate for skilled therapy at this time. Will d/c PT orders. Recommend return to long term care at d/c.

## 2020-03-06 LAB
ANION GAP SERPL CALC-SCNC: 4 MMOL/L (ref 0–18)
BUN BLD-MCNC: 27 MG/DL (ref 7–18)
BUN/CREAT SERPL: 18.9 (ref 10–20)
CALCIUM BLD-MCNC: 8.1 MG/DL (ref 8.5–10.1)
CHLORIDE SERPL-SCNC: 112 MMOL/L (ref 98–112)
CO2 SERPL-SCNC: 26 MMOL/L (ref 21–32)
CREAT BLD-MCNC: 1.43 MG/DL (ref 0.7–1.3)
GLUCOSE BLD-MCNC: 82 MG/DL (ref 70–99)
OSMOLALITY SERPL CALC.SUM OF ELEC: 298 MOSM/KG (ref 275–295)
POTASSIUM SERPL-SCNC: 4.3 MMOL/L (ref 3.5–5.1)
SODIUM SERPL-SCNC: 142 MMOL/L (ref 136–145)

## 2020-03-06 PROCEDURE — 80048 BASIC METABOLIC PNL TOTAL CA: CPT | Performed by: HOSPITALIST

## 2020-03-06 PROCEDURE — 87040 BLOOD CULTURE FOR BACTERIA: CPT | Performed by: HOSPITALIST

## 2020-03-06 NOTE — PROGRESS NOTES
[facility] Hospitalist Progress Note                                                                   [address] [facility]  10/8/1935    SUBJECTIVE/24 hour events:  -Spiked a temp this morning.   Will 8. 0* 8.1*   GLU 94 120* 75 82       No results for input(s): ALT, AST, ALB, AMYLASE, LIPASE, LDH in the last 168 hours. Invalid input(s): ALPHOS, TBIL, DBIL, TPROT    No results for input(s): PGLU in the last 168 hours.     Meds:   Scheduled:   • cefTRIA [phone]

## 2020-03-06 NOTE — PLAN OF CARE
Problem: Patient Centered Care  Goal: Patient preferences are identified and integrated in the patient's plan of care  Description  Interventions:  - What would you like us to know as we care for you?   - Provide timely, complete, and accurate informatio pt/family on patient safety including physical limitations  - Instruct pt to call for assistance with activity based on assessment  - Modify environment to reduce risk of injury  - Provide assistive devices as appropriate  - Consider OT/PT consult to remedios patient's volume status, including labs, urine output, blood pressure (other measures as available)  - Encourage oral intake as appropriate  - Instruct patient on fluid and nutrition restrictions as appropriate  Outcome: Progressing     Problem: SKIN/TISSU

## 2020-03-06 NOTE — PLAN OF CARE
Problem: Patient Centered Care  Goal: Patient preferences are identified and integrated in the patient's plan of care  Description  Interventions:  - What would you like us to know as we care for you?   - Provide timely, complete, and accurate informatio for assistance with activity based on assessment  - Modify environment to reduce risk of injury  - Provide assistive devices as appropriate  - Consider OT/PT consult to assist with strengthening/mobility  - Encourage toileting schedule  Outcome: Progressin available)  - Encourage oral intake as appropriate  - Instruct patient on fluid and nutrition restrictions as appropriate  Outcome: Progressing     Problem: SKIN/TISSUE INTEGRITY - ADULT  Goal: Skin integrity remains intact  Description  INTERVENTIONS  - A

## 2020-03-07 LAB
ANION GAP SERPL CALC-SCNC: 7 MMOL/L (ref 0–18)
BASOPHILS # BLD AUTO: 0.01 X10(3) UL (ref 0–0.2)
BASOPHILS NFR BLD AUTO: 0.3 %
BUN BLD-MCNC: 23 MG/DL (ref 7–18)
BUN/CREAT SERPL: 19.2 (ref 10–20)
CALCIUM BLD-MCNC: 7.8 MG/DL (ref 8.5–10.1)
CHLORIDE SERPL-SCNC: 112 MMOL/L (ref 98–112)
CO2 SERPL-SCNC: 25 MMOL/L (ref 21–32)
CREAT BLD-MCNC: 1.2 MG/DL (ref 0.7–1.3)
DEPRECATED RDW RBC AUTO: 57 FL (ref 35.1–46.3)
EOSINOPHIL # BLD AUTO: 0.01 X10(3) UL (ref 0–0.7)
EOSINOPHIL NFR BLD AUTO: 0.3 %
ERYTHROCYTE [DISTWIDTH] IN BLOOD BY AUTOMATED COUNT: 16.5 % (ref 11–15)
GLUCOSE BLD-MCNC: 81 MG/DL (ref 70–99)
HCT VFR BLD AUTO: 30.6 % (ref 39–53)
HGB BLD-MCNC: 9.7 G/DL (ref 13–17.5)
IMM GRANULOCYTES # BLD AUTO: 0.04 X10(3) UL (ref 0–1)
IMM GRANULOCYTES NFR BLD: 1.1 %
LYMPHOCYTES # BLD AUTO: 1.28 X10(3) UL (ref 1–4)
LYMPHOCYTES NFR BLD AUTO: 36.1 %
MCH RBC QN AUTO: 29.7 PG (ref 26–34)
MCHC RBC AUTO-ENTMCNC: 31.7 G/DL (ref 31–37)
MCV RBC AUTO: 93.6 FL (ref 80–100)
MONOCYTES # BLD AUTO: 0.22 X10(3) UL (ref 0.1–1)
MONOCYTES NFR BLD AUTO: 6.2 %
NEUTROPHILS # BLD AUTO: 1.99 X10 (3) UL (ref 1.5–7.7)
NEUTROPHILS # BLD AUTO: 1.99 X10(3) UL (ref 1.5–7.7)
NEUTROPHILS NFR BLD AUTO: 56 %
OSMOLALITY SERPL CALC.SUM OF ELEC: 301 MOSM/KG (ref 275–295)
PLATELET # BLD AUTO: 147 10(3)UL (ref 150–450)
POTASSIUM SERPL-SCNC: 4 MMOL/L (ref 3.5–5.1)
RBC # BLD AUTO: 3.27 X10(6)UL (ref 3.8–5.8)
SODIUM SERPL-SCNC: 144 MMOL/L (ref 136–145)
WBC # BLD AUTO: 3.6 X10(3) UL (ref 4–11)

## 2020-03-07 PROCEDURE — 85025 COMPLETE CBC W/AUTO DIFF WBC: CPT | Performed by: HOSPITALIST

## 2020-03-07 PROCEDURE — 80048 BASIC METABOLIC PNL TOTAL CA: CPT | Performed by: HOSPITALIST

## 2020-03-07 PROCEDURE — 94640 AIRWAY INHALATION TREATMENT: CPT

## 2020-03-07 RX ORDER — CEPHALEXIN 500 MG/1
500 CAPSULE ORAL 2 TIMES DAILY
Qty: 16 CAPSULE | Refills: 0 | Status: SHIPPED | OUTPATIENT
Start: 2020-03-07 | End: 2020-03-15

## 2020-03-07 RX ORDER — IPRATROPIUM BROMIDE AND ALBUTEROL SULFATE 2.5; .5 MG/3ML; MG/3ML
3 SOLUTION RESPIRATORY (INHALATION) EVERY 4 HOURS PRN
Status: DISCONTINUED | OUTPATIENT
Start: 2020-03-07 | End: 2020-03-08

## 2020-03-07 RX ORDER — SODIUM CHLORIDE 9 MG/ML
INJECTION, SOLUTION INTRAVENOUS CONTINUOUS
Status: ACTIVE | OUTPATIENT
Start: 2020-03-07 | End: 2020-03-08

## 2020-03-07 NOTE — PROGRESS NOTES
[facility] Hospitalist Progress Note                                                                   [address] [employer]  10/8/1935    SUBJECTIVE/24 hour events:  C/o dry mouth but otherwise imrpove Oral QAM AC   • Heparin Sodium (Porcine)  5,000 Units Subcutaneous Q8H [address]     Continuous Infusions:   • sodium chloride       PRN: ipratropium-albuterol, Normal Saline Flush, acetaminophen, PEG 3350, bisacodyl, ondansetron HCl, Metoclopramide HCl    Asses

## 2020-03-07 NOTE — DISCHARGE SUMMARY
[facility] Internal Medicine Discharge Summary   Patient ID:  Niranjan Stahl  [id number]  61 year old  10/8/1935    Admit date: 3/4/2020    Discharge date and time: 3/7/2020     Attending Physician: Renea Lefort, MD     Primary Care Physician:  Tomeka Yousif, times daily for 8 days.         CHANGE how you take these medications    Levothyroxine Sodium 125 MCG Tabs  9 tabs per week  What changed:  additional instructions        CONTINUE taking these medications    acetaminophen 325 MG Tabs  Commonly known as:  TY HCl 4 MG Tabs  Commonly known as:  ZANAFLEX     Trolamine Salicylate 10 % Crea        STOP taking these medications    diphenhydrAMINE HCl 25 MG Caps  Commonly known as:  BENADRYL     furosemide 20 MG Tabs  Commonly known as:  LASIX           Where to Get Time Coordinating Care: > than 30 minutes    Patient had opportunity to ask questions and state understand and agree with therapeutic plan as outlined      MD Abhilash Perez  [phone]  3/7/2020  12:59 PM

## 2020-03-07 NOTE — PLAN OF CARE
Problem: Patient Centered Care  Goal: Patient preferences are identified and integrated in the patient's plan of care  Description  Interventions:  - What would you like us to know as we care for you?  Lives at [employer]  - Provide timely, comple limitations  - Instruct pt to call for assistance with activity based on assessment  - Modify environment to reduce risk of injury  - Provide assistive devices as appropriate  - Consider OT/PT consult to assist with strengthening/mobility  - Encourage toil blood pressure (other measures as available)  - Encourage oral intake as appropriate  - Instruct patient on fluid and nutrition restrictions as appropriate  Outcome: Progressing     Problem: SKIN/TISSUE INTEGRITY - ADULT  Goal: Skin integrity remains intac

## 2020-03-08 VITALS
WEIGHT: 262.69 LBS | BODY MASS INDEX: 35.58 KG/M2 | HEIGHT: 72 IN | OXYGEN SATURATION: 92 % | SYSTOLIC BLOOD PRESSURE: 141 MMHG | RESPIRATION RATE: 16 BRPM | DIASTOLIC BLOOD PRESSURE: 58 MMHG | HEART RATE: 61 BPM | TEMPERATURE: 99 F

## 2020-03-08 LAB
ANION GAP SERPL CALC-SCNC: 6 MMOL/L (ref 0–18)
BASOPHILS # BLD AUTO: 0.01 X10(3) UL (ref 0–0.2)
BASOPHILS NFR BLD AUTO: 0.3 %
BUN BLD-MCNC: 21 MG/DL (ref 7–18)
BUN/CREAT SERPL: 16.8 (ref 10–20)
CALCIUM BLD-MCNC: 8 MG/DL (ref 8.5–10.1)
CHLORIDE SERPL-SCNC: 110 MMOL/L (ref 98–112)
CO2 SERPL-SCNC: 25 MMOL/L (ref 21–32)
CREAT BLD-MCNC: 1.25 MG/DL (ref 0.7–1.3)
DEPRECATED RDW RBC AUTO: 56.1 FL (ref 35.1–46.3)
EOSINOPHIL # BLD AUTO: 0.02 X10(3) UL (ref 0–0.7)
EOSINOPHIL NFR BLD AUTO: 0.5 %
ERYTHROCYTE [DISTWIDTH] IN BLOOD BY AUTOMATED COUNT: 16.4 % (ref 11–15)
GLUCOSE BLD-MCNC: 81 MG/DL (ref 70–99)
HCT VFR BLD AUTO: 30.1 % (ref 39–53)
HGB BLD-MCNC: 9.5 G/DL (ref 13–17.5)
IMM GRANULOCYTES # BLD AUTO: 0.06 X10(3) UL (ref 0–1)
IMM GRANULOCYTES NFR BLD: 1.5 %
LYMPHOCYTES # BLD AUTO: 1.37 X10(3) UL (ref 1–4)
LYMPHOCYTES NFR BLD AUTO: 35.3 %
MCH RBC QN AUTO: 29.6 PG (ref 26–34)
MCHC RBC AUTO-ENTMCNC: 31.6 G/DL (ref 31–37)
MCV RBC AUTO: 93.8 FL (ref 80–100)
MONOCYTES # BLD AUTO: 0.24 X10(3) UL (ref 0.1–1)
MONOCYTES NFR BLD AUTO: 6.2 %
NEUTROPHILS # BLD AUTO: 2.18 X10 (3) UL (ref 1.5–7.7)
NEUTROPHILS # BLD AUTO: 2.18 X10(3) UL (ref 1.5–7.7)
NEUTROPHILS NFR BLD AUTO: 56.2 %
OSMOLALITY SERPL CALC.SUM OF ELEC: 294 MOSM/KG (ref 275–295)
PLATELET # BLD AUTO: 145 10(3)UL (ref 150–450)
POTASSIUM SERPL-SCNC: 4 MMOL/L (ref 3.5–5.1)
RBC # BLD AUTO: 3.21 X10(6)UL (ref 3.8–5.8)
SODIUM SERPL-SCNC: 141 MMOL/L (ref 136–145)
WBC # BLD AUTO: 3.9 X10(3) UL (ref 4–11)

## 2020-03-08 PROCEDURE — 85025 COMPLETE CBC W/AUTO DIFF WBC: CPT | Performed by: HOSPITALIST

## 2020-03-08 PROCEDURE — 80048 BASIC METABOLIC PNL TOTAL CA: CPT | Performed by: HOSPITALIST

## 2020-03-08 NOTE — CM/SW NOTE
12: 30PM SW received call from RN stating pt is ready for DC back to [facility]. EDWIN called and spoke with Juan Carlos Moore from [place] who states no information/ updates were sent regarding pt's stay at the hospital.     EDWIN sent referral through [place].

## 2020-03-08 NOTE — DISCHARGE SUMMARY
[facility] Internal Medicine Discharge Summary   Patient ID:  Kimberley Chaudhary  [id number]  68 year old  10/8/1935    Admit date: 3/4/2020    Discharge date and time: 3/8/2020     Attending Physician: Anisa Dodge MD     Primary Care Physician:  Miriam Godfrey, sulfate (2.5 MG/3ML) 0.083% Nebu  Commonly known as:  VENTOLIN     allopurinol 300 MG Tabs  Commonly known as:  ZYLOPRIM     Aspirin Low Dose 81 MG Tabs  Generic drug:  aspirin     atorvastatin 20 MG Tabs  Commonly known as:  LIPITOR     bisacodyl 10 MG Felder You can get these medications from any pharmacy    Bring a paper prescription for each of these medications  · cephALEXin 500 MG Caps         Consults: IP CONSULT TO HOSPITALIST    Radiology: Xr Chest Ap Portable  (cpt=71045)    Result Date: 3/4/2020  [facility]

## 2020-03-08 NOTE — PLAN OF CARE
Problem: Patient Centered Care  Goal: Patient preferences are identified and integrated in the patient's plan of care  Description  Interventions:  - What would you like us to know as we care for you?  \" I haven't walked in over 4 years\"    - Provide ti injury  Description  INTERVENTIONS:  - Assess pt frequently for physical needs  - Identify cognitive and physical deficits and behaviors that affect risk of falls.   - [place] fall precautions as indicated by assessment.  - Educate pt/family on patient sa signs and symptoms of volume excess or deficit  - Monitor intake, output and patient weight  - Monitor urine specific gravity, serum osmolarity and serum sodium as indicated or ordered  - Monitor response to interventions for patient's volume status, inclu

## 2020-03-08 NOTE — PLAN OF CARE
Problem: Patient Centered Care  Goal: Patient preferences are identified and integrated in the patient's plan of care  Description  Interventions:  - What would you like us to know as we care for you?   - Provide timely, complete, and accurate informatio limitations  - Instruct pt to call for assistance with activity based on assessment  - Modify environment to reduce risk of injury  - Provide assistive devices as appropriate  - Consider OT/PT consult to assist with strengthening/mobility  - Encourage toil status, including labs, urine output, blood pressure (other measures as available)  - Encourage oral intake as appropriate  - Instruct patient on fluid and nutrition restrictions as appropriate  Outcome: Adequate for Discharge     Problem: SKIN/TISSUE INTE

## 2020-10-13 ENCOUNTER — HOSPITAL ENCOUNTER (OUTPATIENT)
Dept: CT IMAGING | Facility: HOSPITAL | Age: 85
Discharge: HOME | End: 2020-10-13
Attending: INTERNAL MEDICINE
Payer: MEDICARE

## 2020-10-13 DIAGNOSIS — R58 INTERNAL BLEEDING: ICD-10-CM

## 2020-10-13 PROCEDURE — 70450 CT HEAD/BRAIN W/O DYE: CPT | Performed by: INTERNAL MEDICINE

## 2021-02-01 DIAGNOSIS — Z23 NEED FOR VACCINATION: ICD-10-CM

## 2021-07-27 ENCOUNTER — HOSPITAL ENCOUNTER (OUTPATIENT)
Dept: MRI IMAGING | Facility: HOSPITAL | Age: 86
Discharge: HOME | End: 2021-07-27
Attending: INTERNAL MEDICINE
Payer: MEDICARE

## 2021-07-27 DIAGNOSIS — R44.3 HALLUCINATION: ICD-10-CM

## 2021-07-27 PROCEDURE — 70551 MRI BRAIN STEM W/O DYE: CPT | Performed by: INTERNAL MEDICINE

## (undated) NOTE — ED AVS SNAPSHOT
Bernardo Patel   MRN: [id number]    Department:  [facility] Emergency Department   Date of Visit:  9/28/2018           Disclosure     Insurance plans vary and the physician(s) referred by the ER may not be covered by your plan.  Please contact within the next three months to obtain basic health screening including reassessment of your blood pressure.     IF THERE IS ANY CHANGE OR WORSENING OF YOUR CONDITION, CALL YOUR PRIMARY CARE PHYSICIAN AT ONCE OR RETURN IMMEDIATELY TO THE EMERGENCY DEPARTMEN

## (undated) NOTE — LETTER
[address] [facility], [facility] [address]     I agree to have a Peripherally Inserted Central Catheter (PICC) placed in my arm.      1. The PICC insertion procedure, care, maintenance, risks, benefits, and complications Statement of Physician: My signature below affirms that prior to the time of the PICC line insertion, I have explained to the patient and/or his/her legal representative, the risks and benefits involved in the proposed treatment and any reasonable alternat

## (undated) NOTE — ED AVS SNAPSHOT
[facility] Emergency Department    [address]     [address] [place] [address]    Phone:  [phone]    Fax:  [phone]           Roge Montoya   MRN: [id number]    Department:  [facility] Emergency Department   Date of Visit:  4/4 indicado, llame al encargado de erin al [phone]. It is our goal to assure that you are completely satisfied with every aspect of your visit today.   In an effort to constantly improve our service to you, we would appreciate any positive or negativ Any imaging studies and labs completed today can be reviewed in your MyChart account. You may have had testing done that requires us to contact you. Please make sure we have your correct phone number on file.       I certified that I have received a copy You can access your MyChart to more actively manage your health care and view more details from this visit by going to [email or web address].   If you've recently had a stay at the Hospital you can access your discharge instructions in [address] by mica

## (undated) NOTE — IP AVS SNAPSHOT
Patient Demographics     Address  [address] [facility] [address] Phone  [phone] [facility]) *Preferred*  [phone] [facility]) E-mail Address  [email or web address]      Emergency Contact(s)     Name Relation Home Work Mobile Take 1 capsule (500 mg total) by mouth 2 (two) times daily for 8 days. Stop taking on:  March 15, 2020   Perry Grant MD         cholecalciferol 1000 UNITS Caps  Commonly known as:  VITAMIN D3      Take 2,000 Units by mouth daily.           Diclofenac S Commonly known as:  DELTASONE      Take 10 mg by mouth daily. Senna-Docusate Sodium 8.6-50 MG Tabs  Commonly known as:  SENOKOT S      Take 1 tablet by mouth daily.           sennosides 8.6 MG Tabs  Commonly known as:  SENOKOT      Take 1 tablet by [id number] carbidopa-levodopa (SINEMET)  MG per tab 1 tablet 03/07/20 2048 Given      [id number] carbidopa-levodopa (SINEMET)  MG per tab 1 tablet 03/08/20 0947 Given      [id number] cholecalciferol (VITAMIN D3) cap/tab 2,000 Units 03/08/20 0998 Potassium 4.0 3.5 - 5.1 mmol/L — [facility]   Chloride 110 98 - 112 mmol/L — [facility]   CO2 25.0 21.0 - 32.0 mmol/L — [facility]   Anion Gap 6 0 - 18 mmol/L — [facility]   BUN 21 7 - 18 mg/dL H [facility]   Creatinine 1.25 0.70 - 1.30 mg/dL — E Blood Culture Result No Growth 2 Days    Urine Culture, Routine Once [[id number]]  (Abnormal)  (Susceptibility) Collected:  03/05/20 1048    Order Status:  Completed Lab Status:  Final result Updated:  03/06/20 0354    Specimen:  Urine, clean catch      U Order Status:  Completed Lab Status:  Final result Updated:  03/04/20 8017    Specimen:  Other from [facility]      MRSA Screen By PCR Positive    Narrative:       A positive result does not necessarily indicate the presence of viable organisms.  For [employer] Procedure Laterality Date   • CERVICAL EPIDURAL INJECTION N/A 4/24/2014    Performed by Adam Lawton MD at [address] N/A 3/20/2014    Performed by Adam Lawton MD at [address] [place] [address] Neurologic: Normal strength, no focal deficit appreciated     Data Review:    LABS:   Lab Results   Component Value Date    WBC 6.0 03/04/2020    HGB 11.2 03/04/2020    HCT 35.8 03/04/2020    .0 03/04/2020    CREATSERUM 2.22 03/04/2020    BUN 40 03/ Patient is a 80year old male with PMH sig for MDD, hypothyroidism[BS.1],[BS.2] obesity, HL, eHTN, and GENET who presents with a cc of generalized weakness.       Problem List:  Generalized weakness  YESENIA  MDD   Hypothyroidism  HL  eHTN  GENET    Generalized Wea Discharge [facility]. 1]   Generalized weakness  UTI  YESENIA  MDD   Hypothyroidism  HL  HTN  GENET[RK. 2]    Discharged Condition:[RK.1] [address] 2]    Disposition:[RK.1] home[RK.2]    Important follow up:[RK.1]  Tk Bliss MD  [address] [facility] bisacodyl 10 MG Supp  Commonly known as:  DULCOLAX     buPROPion HCl ER (XL) 150 MG Tb24  Commonly known as:  WELLBUTRIN XL     carbidopa-levodopa  MG Tabs  Commonly known as:  SINEMET     cholecalciferol 1000 UNITS Caps  Commonly known as:  VITAMIN Radiology: Xr Chest Ap Portable  (cpt=71045)    Result Date: 3/4/2020  PROCEDURE: XR CHEST AP PORTABLE (CPT=71010) TIME: 949.    COMPARISON: [facility], XR CHEST AP PORTABLE (CPT=71045), 6/06/2018, 7:32 PM.  [facility], X BAILEE Physical Therapy Notes (last 72 hours) (Notes from 3/5/2020  2:42 PM through 3/8/2020  2:42 PM)    No notes of this type exist for this encounter.      Occupational Therapy Notes (last 72 hours) (Notes from 3/5/2020  2:42 PM through 3/8/2020  2:42 PM)    No

## (undated) NOTE — ED AVS SNAPSHOT
[facility] Emergency Department    [place] [address]     [address] [place] [address]    Phone:  [phone]    Fax:  [phone]           Carla Santos   MRN: [id number]    Department:  [facility] Emergency Department   Date of Visit:  4/4 and Class Registration line at [phone] or find a doctor online by visiting [email or web address].    IF THERE IS ANY CHANGE OR WORSENING OF YOUR CONDITION, CALL YOUR PRIMARY CARE PHYSICIAN AT ONCE OR RETURN IMMEDIATELY TO [address] [place] [address].     If

## (undated) NOTE — IP AVS SNAPSHOT
[facility]            (For Outpatient Use Only) Initial Admit Date: 3/4/2020   Inpt/Obs Admit Date: Inpt: 3/4/20 / Obs: N/A   Discharge Date:    Daina Proper:  [de-identified]   MRN: [de-identified]   CSN: [id number]   CEID: [id number]        [facility] Subscriber Name:  Ruthy Bhatia :    Subscriber ID:  Pt Rel to Subscriber:    Hospital Account Financial Class: Medicare    2020

## (undated) NOTE — IP AVS SNAPSHOT
Patient Demographics     Address  [place]  Phone  [phone] [facility])  [phone] [facility] E-mail Address  [email or web address]      Emergency Contact(s)     Name Relation Home Work Dora Steele [phone] magnesium hydroxide 400 MG/5ML Susp  Commonly known as:  MILK OF MAGNESIA     MIRALAX Powd  Generic drug:  Polyethylene Glycol 3350     omeprazole 20 MG Cpdr  Commonly known as:  PRILOSEC     Sertraline HCl 50 MG Tabs  Commonly known as:  ZOLOFT     tamsul Next dose due:  Please start tonight 6/12      Take 10 mg by mouth nightly. CYMBALTA 60 MG Cpep  Generic drug:  DULoxetine HCl  Next dose due:  Please start tomorrow 6/13 in AM      Take 60 mg by mouth daily.           Donepezil HCl 10 MG Tabs  Com Inject 500 mg into the vein every 8 (eight) hours. Stop taking on:  6/26/2018   Savana Moscoso MD         tamsulosin HCl 0.4 MG Caps  Commonly known as:  FLOMAX  Next dose due:  Please start tomorrow 6/13 in AM      Take 0.4 mg by mouth daily.           George [id number] finasteride (PROSCAR) tab 5 mg 06/12/18 0835 Given      [id number] gabapentin (NEURONTIN) cap 600 mg 06/11/18 1959 Given            LEFT LOWER ABDOMEN     Order ID Medication Name Action Time Action Reason Comments    [id number] Enoxaparin Sodium CBC WITH DIFFERENTIAL WITH PLATELET [[id number]]  Resulted: 06/12/18 0614, Result status: Final result   Ordering provider:  Fernando Albarran MD  06/11/18 2169 Resulting lab:  [facility] LAB   Narrative:   The following orders were created for panel order CBC WITH D :  Daksha Partida MD (Physician)       [facility] HOSPITALIST HISTORY AND PHYSICAL     CC: Patient presents with:  Abnormal Result (metabolic, cardiac)       PCP: Ata Villanueva MD    History of Present Illness:   Patient is a 80year old male with PMH si 4/24/2014: INJECTION, W/WO CONTRAST, DX/THERAPEUTIC SUBST*      Comment: Procedure: CERVICAL EPIDURAL INJECTION;                 Surgeon: Tomeka Patel MD;  Location: [address] [place] [address]  No date: OTHER SURGICAL HISTORY      Comment: CV: rrr, +s1/s2, PMI non displaced  LUNGS: CTAB, normal resp effort  ABL soft, NT/ND, NABS,[RK.1] obese[RK.2]  EXT: no LE edema b/l , DP pulses 2+ b/l  Neuro: sensation intact, no focal deficits  SKIN- no rashes, no lesion  PSYCH-[RK.1] aao to self not loc nonobstructing calculus at the distal right ureter image 124 series 2. Mild leak prominent left ureter without left hydronephrosis. Left uroepithelial thickening/edema not well-seen. ADRENALS:   No defined mass or abnormal enlargement.   URINARY BLADDER: P at multiple lumbar levels. Prior right total hip arthroplasty. Chronic degenerative changes left hip with no definite evidence to suggest avascular necrosis.  No visualized acute osseous abnormality LUNG BASES: Mild posterior pleural thickening versus trace [facility], X CHEST PA LAT ROUTINE, 6/04/2016, 15:09. [facility], X CHEST PA LAT ROUTINE, 6/05/2016, 13:21. INDICATIONS: Fever today  TECHNIQUE:   Single view.    FINDINGS:  CARDIAC/VASC: Heart size and pulmonary vascularit Electronically signed by Yoanna Acharya MD on 6/8/2018 11:03 AM   Attribution Peck    RK. 1 - Yoanna Acharya MD on 6/8/2018  9:57 AM  RK. 2 - Yoanna Acharya MD on 6/8/2018 10:57 AM  RK. 3 - Yoanna Acharya MD on 6/8/2018 10:02 AM                        Cons 3/20/2014: INJECTION, W/WO CONTRAST, DX/THERAPEUTIC SUBST*      Comment: Procedure: CERVICAL EPIDURAL INJECTION;                 Surgeon: Carmen Stevens MD;  Location: [address] [place] [address]  4/24/2014: INJECTION, W/WO CONTRAST, DX/THERAP •  DULoxetine HCl (CYMBALTA) DR particles cap 60 mg, 60 mg, Oral, Daily  •  finasteride (PROSCAR) tab 5 mg, 5 mg, Oral, Daily  •  Fluticasone Propionate (FLONASE) 50 MCG/ACT nasal spray 1 spray, 1 spray, Nasal, Daily  •  Levothyroxine Sodium (SYNTHROID, LE MCV  90.2  90.1  90.2   MCH  30.1  29.7  30.5   MCHC  33.4  33.0  33.8   RDW  16.9*  16.7*  17.2*   WBC  7.4  8.7  8.2   PLT  232  227  224       Recent Labs   Lab  06/06/18   1943  06/07/18   1814  06/08/18   0554   GLU  133*  114*  97   BUN  35*  32*  28 Chart reviewed, RN provided consent to see pt. [AO.1]    Subjective/ History:[AO.2]   Pt greeted, pt reporting he is mostly bed bound at baseline, living at [facility] with assist for all ADLs.  Pt reports he transfers to w/c 1x/week to have dinne set, ankle pumps. Pt able to perform all correctly post demo and verbal cues, agreeable to perform home exercises-staff and daughter to encourage as able.      Pt appears to be at his functional baseline- total assist for all ADLs, to d/c from acute PT at t functional decline over the past 2 years. Anticipate pt is not fully aware of his deficits as he has Parkinson's Disease; noted increased rigidity throughout BUEs, and BLEs, left side worse than R, as well as flexion contractures throughout his body.  Pt  Language Pathologist    Filed:  6/9/2018 12:03 PM Date of Service:  6/9/2018 11:50 AM Status:  Signed    :  Slade Traore SLP (Speech and Language Pathologist)       ADULT SWALLOWING EVALUATION    ASSESSMENT    ASSESSMENT/OVERALL IMPRESSION:  Pt s MEDICAL HISTORY  Reason for Referral: Other (Comment) (AMS)    Problem List  Principal Problem:    Urinary tract infection without hematuria, site unspecified  Active Problems:    Urinary tract infection without hematuria    Bacteremia    Past Medical Hist Esophageal Phase of Swallow: No complaints consistent with possible esophageal involvement    GOALS  Goal #1 The patient will tolerate mechanical soft consistency and thin liquids without overt signs or symptoms of aspiration with 100 % accuracy over 2 ses

## (undated) NOTE — LETTER
July 19, 2017    Luzmaria Parekh  [address] [facility]      Dear Koki Tenorio:    Your results were reviewed by Dr. Hollis Ríos with the following comments:  CT Scan of Brain/Head is normal.      Thank you,      Nacho Yanez.  Hollis Ríos